# Patient Record
Sex: FEMALE | Race: OTHER | HISPANIC OR LATINO | Employment: OTHER | URBAN - METROPOLITAN AREA
[De-identification: names, ages, dates, MRNs, and addresses within clinical notes are randomized per-mention and may not be internally consistent; named-entity substitution may affect disease eponyms.]

---

## 2020-02-29 LAB — HCV AB SER-ACNC: NEGATIVE

## 2022-01-23 ENCOUNTER — HOSPITAL ENCOUNTER (OUTPATIENT)
Facility: HOSPITAL | Age: 77
Setting detail: OBSERVATION
Discharge: HOME | End: 2022-01-24
Attending: EMERGENCY MEDICINE | Admitting: INTERNAL MEDICINE
Payer: MEDICARE

## 2022-01-23 ENCOUNTER — APPOINTMENT (EMERGENCY)
Dept: RADIOLOGY | Facility: HOSPITAL | Age: 77
End: 2022-01-23
Payer: MEDICARE

## 2022-01-23 DIAGNOSIS — U07.1 COVID: ICD-10-CM

## 2022-01-23 DIAGNOSIS — R53.1 WEAKNESS: ICD-10-CM

## 2022-01-23 DIAGNOSIS — R55 NEAR SYNCOPE: Primary | ICD-10-CM

## 2022-01-23 PROBLEM — I10 ESSENTIAL HYPERTENSION: Status: ACTIVE | Noted: 2022-01-23

## 2022-01-23 PROBLEM — Z86.718 HISTORY OF DVT (DEEP VEIN THROMBOSIS): Status: ACTIVE | Noted: 2022-01-23

## 2022-01-23 PROBLEM — E03.9 ACQUIRED HYPOTHYROIDISM: Status: ACTIVE | Noted: 2022-01-23

## 2022-01-23 PROBLEM — D45 POLYCYTHEMIA VERA (HCC): Status: ACTIVE | Noted: 2022-01-23

## 2022-01-23 PROBLEM — C50.919 BREAST CANCER (HCC): Status: ACTIVE | Noted: 2022-01-23

## 2022-01-23 PROBLEM — E87.6 HYPOKALEMIA: Status: ACTIVE | Noted: 2022-01-23

## 2022-01-23 PROBLEM — J12.82 PNEUMONIA DUE TO COVID-19 VIRUS: Status: ACTIVE | Noted: 2022-01-23

## 2022-01-23 LAB
ALBUMIN SERPL BCP-MCNC: 2.5 G/DL (ref 3–5.2)
ALP SERPL-CCNC: 60 U/L (ref 43–122)
ALT SERPL W P-5'-P-CCNC: 12 U/L
ANION GAP SERPL CALCULATED.3IONS-SCNC: 2 MMOL/L (ref 5–14)
ANISOCYTOSIS BLD QL SMEAR: PRESENT
AST SERPL W P-5'-P-CCNC: 50 U/L (ref 14–36)
ATRIAL RATE: 77 BPM
BILIRUB SERPL-MCNC: 1.04 MG/DL
BUN SERPL-MCNC: 8 MG/DL (ref 5–25)
BURR CELLS BLD QL SMEAR: PRESENT
CALCIUM ALBUM COR SERPL-MCNC: 7.7 MG/DL (ref 8.3–10.1)
CALCIUM SERPL-MCNC: 6.5 MG/DL (ref 8.4–10.2)
CARDIAC TROPONIN I PNL SERPL HS: 3 NG/L
CHLORIDE SERPL-SCNC: 114 MMOL/L (ref 97–108)
CO2 SERPL-SCNC: 24 MMOL/L (ref 22–30)
CREAT SERPL-MCNC: 0.61 MG/DL (ref 0.6–1.2)
DACRYOCYTES BLD QL SMEAR: PRESENT
EOSINOPHIL # BLD AUTO: 0.92 THOUSAND/UL (ref 0–0.4)
EOSINOPHIL NFR BLD MANUAL: 6 % (ref 0–6)
ERYTHROCYTE [DISTWIDTH] IN BLOOD BY AUTOMATED COUNT: 25.3 %
GFR SERPL CREATININE-BSD FRML MDRD: 88 ML/MIN/1.73SQ M
GLUCOSE SERPL-MCNC: 97 MG/DL (ref 70–99)
HCT VFR BLD AUTO: 45.4 % (ref 36–46)
HGB BLD-MCNC: 13.4 G/DL (ref 12–16)
HYPERCHROMIA BLD QL SMEAR: PRESENT
LG PLATELETS BLD QL SMEAR: PRESENT
LYMPHOCYTES # BLD AUTO: 0.77 THOUSAND/UL (ref 0.5–4)
LYMPHOCYTES # BLD AUTO: 5 % (ref 25–45)
MCH RBC QN AUTO: 16.2 PG (ref 26–34)
MCHC RBC AUTO-ENTMCNC: 29.5 G/DL (ref 31–36)
MCV RBC AUTO: 55 FL (ref 80–100)
MICROCYTES BLD QL AUTO: PRESENT
MONOCYTES # BLD AUTO: 0.62 THOUSAND/UL (ref 0.2–0.9)
MONOCYTES NFR BLD AUTO: 4 % (ref 1–10)
NEUTS BAND NFR BLD MANUAL: 3 % (ref 0–8)
NEUTS SEG # BLD: 13.09 THOUSAND/UL (ref 1.8–7.8)
NEUTS SEG NFR BLD AUTO: 82 %
OVALOCYTES BLD QL SMEAR: PRESENT
P AXIS: 64 DEGREES
PLATELET # BLD AUTO: 279 THOUSANDS/UL (ref 150–450)
PLATELET BLD QL SMEAR: ADEQUATE
PMV BLD AUTO: 9 FL (ref 8.9–12.7)
POIKILOCYTOSIS BLD QL SMEAR: PRESENT
POLYCHROMASIA BLD QL SMEAR: PRESENT
POTASSIUM SERPL-SCNC: 3.5 MMOL/L (ref 3.6–5)
PR INTERVAL: 170 MS
PROT SERPL-MCNC: 5.6 G/DL (ref 5.9–8.4)
QRS AXIS: -60 DEGREES
QRSD INTERVAL: 134 MS
QT INTERVAL: 432 MS
QTC INTERVAL: 488 MS
RBC # BLD AUTO: 8.27 MILLION/UL (ref 4–5.2)
RBC MORPH BLD: ABNORMAL
SCHISTOCYTES BLD QL SMEAR: PRESENT
SODIUM SERPL-SCNC: 140 MMOL/L (ref 137–147)
T WAVE AXIS: 41 DEGREES
TOTAL CELLS COUNTED SPEC: 100
TSH SERPL DL<=0.05 MIU/L-ACNC: 1.43 UIU/ML (ref 0.47–4.68)
VENTRICULAR RATE: 77 BPM
WBC # BLD AUTO: 15.4 THOUSAND/UL (ref 4.5–11)

## 2022-01-23 PROCEDURE — 36415 COLL VENOUS BLD VENIPUNCTURE: CPT | Performed by: EMERGENCY MEDICINE

## 2022-01-23 PROCEDURE — 85027 COMPLETE CBC AUTOMATED: CPT | Performed by: EMERGENCY MEDICINE

## 2022-01-23 PROCEDURE — 71045 X-RAY EXAM CHEST 1 VIEW: CPT

## 2022-01-23 PROCEDURE — 93010 ELECTROCARDIOGRAM REPORT: CPT | Performed by: INTERNAL MEDICINE

## 2022-01-23 PROCEDURE — 93005 ELECTROCARDIOGRAM TRACING: CPT

## 2022-01-23 PROCEDURE — 99220 PR INITIAL OBSERVATION CARE/DAY 70 MINUTES: CPT | Performed by: INTERNAL MEDICINE

## 2022-01-23 PROCEDURE — 1124F ACP DISCUSS-NO DSCNMKR DOCD: CPT | Performed by: EMERGENCY MEDICINE

## 2022-01-23 PROCEDURE — 96360 HYDRATION IV INFUSION INIT: CPT

## 2022-01-23 PROCEDURE — 84484 ASSAY OF TROPONIN QUANT: CPT | Performed by: EMERGENCY MEDICINE

## 2022-01-23 PROCEDURE — 99285 EMERGENCY DEPT VISIT HI MDM: CPT

## 2022-01-23 PROCEDURE — 84443 ASSAY THYROID STIM HORMONE: CPT | Performed by: INTERNAL MEDICINE

## 2022-01-23 PROCEDURE — 80053 COMPREHEN METABOLIC PANEL: CPT | Performed by: EMERGENCY MEDICINE

## 2022-01-23 PROCEDURE — 85007 BL SMEAR W/DIFF WBC COUNT: CPT | Performed by: EMERGENCY MEDICINE

## 2022-01-23 PROCEDURE — 99285 EMERGENCY DEPT VISIT HI MDM: CPT | Performed by: EMERGENCY MEDICINE

## 2022-01-23 RX ORDER — ACETAMINOPHEN 325 MG/1
650 TABLET ORAL EVERY 6 HOURS PRN
Status: DISCONTINUED | OUTPATIENT
Start: 2022-01-23 | End: 2022-01-24 | Stop reason: HOSPADM

## 2022-01-23 RX ORDER — POTASSIUM CHLORIDE 20 MEQ/1
40 TABLET, EXTENDED RELEASE ORAL ONCE
Status: COMPLETED | OUTPATIENT
Start: 2022-01-23 | End: 2022-01-23

## 2022-01-23 RX ORDER — LEVOTHYROXINE SODIUM 125 UG/1
125 CAPSULE ORAL DAILY
COMMUNITY

## 2022-01-23 RX ORDER — SODIUM CHLORIDE 9 MG/ML
75 INJECTION, SOLUTION INTRAVENOUS CONTINUOUS
Status: DISCONTINUED | OUTPATIENT
Start: 2022-01-23 | End: 2022-01-24

## 2022-01-23 RX ORDER — LEVOTHYROXINE SODIUM 0.12 MG/1
125 TABLET ORAL
Status: DISCONTINUED | OUTPATIENT
Start: 2022-01-23 | End: 2022-01-24 | Stop reason: HOSPADM

## 2022-01-23 RX ORDER — LISINOPRIL 20 MG/1
20 TABLET ORAL DAILY
COMMUNITY

## 2022-01-23 RX ORDER — ONDANSETRON 2 MG/ML
4 INJECTION INTRAMUSCULAR; INTRAVENOUS EVERY 6 HOURS PRN
Status: DISCONTINUED | OUTPATIENT
Start: 2022-01-23 | End: 2022-01-24 | Stop reason: HOSPADM

## 2022-01-23 RX ADMIN — ONDANSETRON 4 MG: 2 INJECTION INTRAMUSCULAR; INTRAVENOUS at 09:46

## 2022-01-23 RX ADMIN — SODIUM CHLORIDE 1000 ML: 0.9 INJECTION, SOLUTION INTRAVENOUS at 06:05

## 2022-01-23 RX ADMIN — POTASSIUM CHLORIDE 40 MEQ: 1500 TABLET, EXTENDED RELEASE ORAL at 12:58

## 2022-01-23 RX ADMIN — APIXABAN 5 MG: 5 TABLET, FILM COATED ORAL at 09:46

## 2022-01-23 RX ADMIN — SODIUM CHLORIDE 75 ML/HR: 0.9 INJECTION, SOLUTION INTRAVENOUS at 09:45

## 2022-01-23 RX ADMIN — SODIUM CHLORIDE 75 ML/HR: 0.9 INJECTION, SOLUTION INTRAVENOUS at 23:25

## 2022-01-23 RX ADMIN — APIXABAN 5 MG: 5 TABLET, FILM COATED ORAL at 17:08

## 2022-01-23 RX ADMIN — LEVOTHYROXINE SODIUM 125 MCG: 125 TABLET ORAL at 09:45

## 2022-01-23 NOTE — ED NOTES
Attempted to get pt OOB for a walking pulse ox, but she stated she was too weak  Provider notified       Hillary Sampson RN  01/23/22 1919

## 2022-01-23 NOTE — NURSING NOTE
Patient has been stable, no complaints, denies SOB  O2 sats  on RA  Ocassional NP moist cough is the only symptom patient states she has at present  Patient ambulated to BR with steady gait, denied dizziness or lightheadedness  With ambulation, patient did not drop below 95% O2 sats on RA  Patient given walker just in case, but denied weakness  Patient remains stable, sitting up in bed watching TV and interactive and talking with staff  Call bell in reach, IVFs infusing

## 2022-01-23 NOTE — ASSESSMENT & PLAN NOTE
· Unvaccinated against COVID-19  · COVID positive 01/19/2022  · The patient does not requiring any supplemental oxygen at this time in will therefore not initiate remdesivir or Decadron  · Continue further supportive care

## 2022-01-23 NOTE — NURSING NOTE
Patient ambulated to [facility] independently with minimal furniture assistance  Patient stated she did not need a walker and denied weakness and ambulated to  without SOB or any difficulty  Patient got herself back into bed without any effort  Call bell in reach, IVF infusing

## 2022-01-23 NOTE — ASSESSMENT & PLAN NOTE
· Likely due to COVID-19  · The patient notes that she is usually able to ambulate independently and perform all ADLs  She lives at home with her daughter    · Will begin IV fluid resuscitation  · Supportive care for COVID pneumonia  · PT/OT evaluation

## 2022-01-23 NOTE — H&P
[facility]  H&P- Demian Ravi 1945, 77 y o  female MRN: [id number]  Unit/Bed#: 7T [facility] 714-02 Encounter: [id number]  Primary Care Provider: No primary care provider on file  Date and time admitted to hospital: 1/23/2022  4:34 AM    * Generalized weakness  Assessment & Plan  · Likely due to COVID-19  · The patient notes that she is usually able to ambulate independently and perform all ADLs  She lives at home with her daughter  · Will begin IV fluid resuscitation  · Supportive care for COVID pneumonia  · PT/OT evaluation    Pneumonia due to COVID-19 virus  Assessment & Plan  · Unvaccinated against COVID-19  · COVID positive 01/19/2022  · The patient does not requiring any supplemental oxygen at this time in will therefore not initiate remdesivir or Decadron  · Continue further supportive care    Hypokalemia  Assessment & Plan  · Replete and recheck morning labs    Polycythemia vera ([facility] [address] )  Assessment & Plan  · Follows outpatient with Heme-Onc  · Last phlebotomy was in December of 2021    History of DVT (deep vein thrombosis)  Assessment & Plan  · Continue Eliquis 5 mg b i d  Breast cancer ([facility] [address] )  Assessment & Plan  · Stage IIIB invasive ductal carcinoma  · Of outpatient with heme-onc    Acquired hypothyroidism  Assessment & Plan  · Check TSH  · Continue home levothyroxine 125 mcg daily    Essential hypertension  Assessment & Plan  · Though it is not documented, the emergency department reports episode of hypotension and dizziness  · Will hold home lisinopril overnight and initiate IV fluid resuscitation      VTE Pharmacologic Prophylaxis: VTE Score: 7 High Risk (Score >/= 5) - Pharmacological DVT Prophylaxis Ordered: apixaban (Eliquis)  Sequential Compression Devices Ordered  Code Status: Level 1 - Full Code   Discussion with family: Patient declined call to        Anticipated Length of Stay: Patient will be admitted on an observation basis with an anticipated length of stay of less than 2 midnights secondary to Generalized weakness due to COVID-19  Total Time for Visit, including Counseling / Coordination of Care: 70 minutes Greater than 50% of this total time spent on direct patient counseling and coordination of care  Chief Complaint:  Generalized weakness    History of Present Illness:  Iraj Gerber is a 77 y o  female with a PMH of polycythemia vera, invasive ductal carcinoma, hypothyroidism, history of DVT, and essential hypertension who presents with the complaint of generalized weakness  The patient was diagnosed with COVID-19 on 01/19/2022  Since that time she has noticed generalized fatigue that has worsened over the past several days  She denies any fever but does report a nonproductive cough  She is unvaccinated against COVID-19  Usually the patient is able to ambulate independently, however, at this time she believes that she is too weak to walk  She normally lives at home with her daughter  In the emergency department, ED physician noted that the patient was relatively hypotensive and felt dizzy  Additionally, nursing attempted to ambulate the patient to evaluate oxygen needs with ambulation but the patient refused to participate as she felt too weak  She had no significant lab abnormality  She was ultimately admitted to the medical floor under observation status for further management  Review of Systems:  Review of Systems   Constitutional: Negative for chills and fever  HENT: Negative for ear pain, sinus pressure and sore throat  Eyes: Negative for pain and visual disturbance  Respiratory: Positive for cough  Negative for shortness of breath and wheezing  Cardiovascular: Negative for chest pain and palpitations  Gastrointestinal: Negative for abdominal pain, constipation, diarrhea, nausea and vomiting  Genitourinary: Negative for dysuria and hematuria     Musculoskeletal: Negative for arthralgias, back pain and myalgias  Skin: Negative for color change and rash  Neurological: Positive for weakness  Negative for dizziness, seizures, syncope and headaches  Psychiatric/Behavioral: Negative for agitation, confusion and hallucinations  All other systems reviewed and are negative  Past Medical and Surgical History:   History reviewed  No pertinent past medical history  History reviewed  No pertinent surgical history  Meds/Allergies:  Prior to Admission medications    Medication Sig Start Date End Date Taking? Authorizing Provider   apixaban (Eliquis) 5 mg Take 5 mg by mouth 2 (two) times a day   Yes Historical Provider, MD   Levothyroxine Sodium 125 MCG CAPS Take 125 mcg by mouth in the morning   Yes Historical Provider, MD   lisinopril (ZESTRIL) 20 mg tablet Take 20 mg by mouth daily   Yes Historical Provider, MD     I have reviewed home medications using recent Epic encounter  Allergies: No Known Allergies    Social History:  Marital Status:    Patient Pre-hospital Living Situation: Home  Patient Pre-hospital Level of Mobility: walks  Patient Pre-hospital Diet Restrictions: None    Substance Use History:   Social History     Substance and Sexual Activity   Alcohol Use Not Currently     Social History     Tobacco Use   Smoking Status Never Smoker   Smokeless Tobacco Never Used     Social History     Substance and Sexual Activity   Drug Use Not Currently       Family History:  History reviewed  No pertinent family history  Physical Exam:     Vitals:   Blood Pressure: 151/83 (01/23/22 0900)  Pulse: 84 (01/23/22 0900)  Temperature: 98 8 °F (37 1 °C) (01/23/22 0900)  Temp Source: Temporal (01/23/22 0900)  Respirations: 18 (01/23/22 0900)  Height: 5' 3" (160 cm) (01/23/22 0900)  Weight - Scale: 71 8 kg (158 lb 4 6 oz) (01/23/22 0900)  SpO2: 98 % (01/23/22 0900)    Physical Exam  Vitals and nursing note reviewed  Constitutional:       General: She is not in acute distress       Appearance: Normal appearance  She is well-developed and normal weight  HENT:      Head: Normocephalic and atraumatic  Mouth/Throat:      Mouth: Mucous membranes are moist       Pharynx: Oropharynx is clear  Eyes:      Extraocular Movements: Extraocular movements intact  Conjunctiva/sclera: Conjunctivae normal    Cardiovascular:      Rate and Rhythm: Normal rate and regular rhythm  Pulses: Normal pulses  Heart sounds: Normal heart sounds  No murmur heard  Pulmonary:      Effort: Pulmonary effort is normal  No respiratory distress  Breath sounds: Normal breath sounds  Abdominal:      General: Bowel sounds are normal  There is no distension  Palpations: Abdomen is soft  Tenderness: There is no abdominal tenderness  Musculoskeletal:         General: Normal range of motion  Cervical back: Normal range of motion and neck supple  Skin:     General: Skin is warm and dry  Neurological:      General: No focal deficit present  Mental Status: She is alert and oriented to person, place, and time  Mental status is at baseline     Psychiatric:         Mood and Affect: Mood normal          Behavior: Behavior normal          Judgment: Judgment normal           Lab Results:  Results from last 7 days   Lab Units 01/23/22  0535   WBC Thousand/uL 15 40*   HEMOGLOBIN g/dL 13 4   HEMATOCRIT % 45 4   PLATELETS Thousands/uL 279   BANDS PCT % 3   LYMPHO PCT % 5*   MONO PCT % 4   EOS PCT % 6     Results from last 7 days   Lab Units 01/23/22  0456   SODIUM mmol/L 140   POTASSIUM mmol/L 3 5*   CHLORIDE mmol/L 114*   CO2 mmol/L 24   BUN mg/dL 8   CREATININE mg/dL 0 61   ANION GAP mmol/L 2*   CALCIUM mg/dL 6 5*   ALBUMIN g/dL 2 5*   TOTAL BILIRUBIN mg/dL 1 04   ALK PHOS U/L 60   ALT U/L 12   AST U/L 50*   GLUCOSE RANDOM mg/dL 97                       Imaging: Reviewed radiology reports from this admission including: chest xray  XR chest 1 view portable   Final Result by Saanm Vigil MD (01/23 1142)      Question mild pulmonary venous congestion versus groundglass opacity from Covid-19  Workstation performed: [id number]             EKG and Other Studies Reviewed on Admission:   · EKG: NSR  HR 77     ** Please Note: This note has been constructed using a voice recognition system   **

## 2022-01-23 NOTE — ASSESSMENT & PLAN NOTE
· Though it is not documented, the emergency department reports episode of hypotension and dizziness  · Will hold home lisinopril overnight and initiate IV fluid resuscitation

## 2022-01-23 NOTE — ED PROVIDER NOTES
History  Chief Complaint   Patient presents with    Flu Symptoms     Pt tested positive for Covid on Monday and is now reporting dizziness  En route [facility] stated low blood pressure  HPI    77 hx of polycythemia, dvt on eliquis since 2018, htn presents to ed for eval of weakness  Patient tested positive for covid a few days ago  Having intermittent lightheadedness and fatigue  Feels near syncope with ambulation  No sob or chest pain  Has myalgias  Denies other complaints on ros  Reportedly en route patient was hypotensive 90/50  Prior to Admission Medications   Prescriptions Last Dose Informant Patient Reported? Taking? Levothyroxine Sodium 125 MCG CAPS 1/22/2022 at Unknown time  Yes Yes   Sig: Take 125 mcg by mouth in the morning   apixaban (Eliquis) 5 mg 1/22/2022 at Unknown time  Yes Yes   Sig: Take 5 mg by mouth 2 (two) times a day   lisinopril (ZESTRIL) 20 mg tablet 1/22/2022 at Unknown time  Yes Yes   Sig: Take 20 mg by mouth daily      Facility-Administered Medications: None       History reviewed  No pertinent past medical history  Medication Sig Dispensed Refills Start Date End Date Status   aspirin 81 MG EC tablet    Indications: Polycythemia vera (HCC) Take 1 tablet (81 mg total) by mouth daily  90 tablet   3 12/09/2020   Active   levothyroxine (SYNTHROID, LEVOTHROID) 125 MCG tablet    Indications: Hypothyroidism, unspecified type Take 1 tablet (125 mcg total) by mouth daily  90 tablet   2 02/22/2021   Active   apixaban (ELIQUIS) 5 mg tab   Take 1 tablet (5 mg total) by mouth 2 (two) times a day  180 tablet   3 07/12/2021   Active   lisinopriL (PRINIVIL,ZESTRIL) 20 MG tablet   Take 1 tablet (20 mg total) by mouth daily  90 tablet   3 07/12/2021   Active         History reviewed  No pertinent surgical history  History reviewed  No pertinent family history  I have reviewed and agree with the history as documented      E-Cigarette/Vaping    E-Cigarette Use Never User E-Cigarette/Vaping Substances    Nicotine No     THC No     CBD No     Flavoring No     Other No     Unknown No      Social History     Tobacco Use    Smoking status: Never Smoker    Smokeless tobacco: Never Used   Vaping Use    Vaping Use: Never used   Substance Use Topics    Alcohol use: Not Currently    Drug use: Not Currently       Review of Systems   Constitutional: Positive for chills and fatigue  Negative for fever  HENT: Negative for sore throat  Eyes: Negative for redness and visual disturbance  Respiratory: Positive for shortness of breath  Negative for cough  Cardiovascular: Negative for chest pain  Gastrointestinal: Negative for abdominal pain, diarrhea and nausea  Genitourinary: Negative for difficulty urinating, dysuria and pelvic pain  Musculoskeletal: Negative for back pain  Skin: Negative for rash  Neurological: Positive for weakness  Negative for syncope and headaches  All other systems reviewed and are negative  Physical Exam  Physical Exam  Vitals and nursing note reviewed  Constitutional:       General: She is not in acute distress  HENT:      Head: Normocephalic and atraumatic  Eyes:      Conjunctiva/sclera: Conjunctivae normal    Cardiovascular:      Rate and Rhythm: Normal rate and regular rhythm  Heart sounds: Normal heart sounds  Pulmonary:      Effort: Pulmonary effort is normal  No respiratory distress  Breath sounds: Normal breath sounds  Comments: No hypoxia or tachypnea  Abdominal:      General: Bowel sounds are normal       Palpations: Abdomen is soft  Tenderness: There is no abdominal tenderness  Musculoskeletal:         General: Normal range of motion  Cervical back: Normal range of motion  Comments: Refusing to ambulate due to weakness  But moving all extremities    Skin:     General: Skin is warm and dry  Findings: No rash     Neurological:      Mental Status: She is alert and oriented to person, place, and time  Cranial Nerves: No cranial nerve deficit  Sensory: No sensory deficit  Motor: No abnormal muscle tone  Coordination: Coordination normal          Vital Signs  ED Triage Vitals [01/23/22 0437]   Temperature Pulse Respirations Blood Pressure SpO2   98 5 °F (36 9 °C) 76 18 159/70 96 %      Temp Source Heart Rate Source Patient Position - Orthostatic VS BP Location FiO2 (%)   Tympanic Monitor Sitting Left arm --      Pain Score       No Pain           Vitals:    01/23/22 0437 01/23/22 0605 01/23/22 0900 01/23/22 1500   BP: 159/70 118/63 151/83 136/81   Pulse: 76 81 84 91   Patient Position - Orthostatic VS: Sitting Lying Lying Lying         Visual Acuity      ED Medications  Medications   apixaban (ELIQUIS) tablet 5 mg (5 mg Oral Given 1/23/22 1708)   levothyroxine tablet 125 mcg (125 mcg Oral Given 1/23/22 0945)   sodium chloride 0 9 % infusion (75 mL/hr Intravenous New Bag 1/23/22 0945)   acetaminophen (TYLENOL) tablet 650 mg (has no administration in time range)   ondansetron (ZOFRAN) injection 4 mg (4 mg Intravenous Given 1/23/22 0946)   sodium chloride 0 9 % bolus 1,000 mL (0 mL Intravenous Stopped 1/23/22 0716)   potassium chloride (K-DUR,KLOR-CON) CR tablet 40 mEq (40 mEq Oral Given 1/23/22 1258)       Diagnostic Studies  Results Reviewed     Procedure Component Value Units Date/Time    TSH, 3rd generation [[id number]]  (Normal) Collected: 01/23/22 1311    Lab Status: Final result Specimen: Blood from Arm, Right Updated: 01/23/22 1421     TSH 3RD GENERATON 1 430 uIU/mL     Narrative:      Patients undergoing fluorescein dye angiography may retain small amounts of fluorescein in the body for 48-72 hours post procedure  Samples containing fluorescein can produce falsely depressed TSH values  If the patient had this procedure,a specimen should be resubmitted post fluorescein clearance        Manual Differential (Non Wam) [[id number]]  (Abnormal) Collected: 01/23/22 9081    Lab Status: Final result Specimen: Blood from Arm, Left Updated: 01/23/22 0604     Segmented % 82 %      Bands % 3 %      Lymphocytes % 5 %      Monocytes % 4 %      Eosinophils, % 6 %      Neutrophils Absolute 13 09 Thousand/uL      Lymphocytes Absolute 0 77 Thousand/uL      Monocytes Absolute 0 62 Thousand/uL      Eosinophils Absolute 0 92 Thousand/uL      Total Counted 100     RBC Morphology abnormal     Anisocytosis Present     Kai Cells Present     Hypochromia Present     Microcytes Present     Ovalocytes Present     Poikilocytes Present     Polychromasia Present     Schistocytes Present     Platelet Estimate Adequate     Large Platelet Present     Tear Drop Cells Present    CBC and differential [[id number]]  (Abnormal) Collected: 01/23/22 0535    Lab Status: Final result Specimen: Blood from Arm, Left Updated: 01/23/22 0559     WBC 15 40 Thousand/uL      RBC 8 27 Million/uL      Hemoglobin 13 4 g/dL      Hematocrit 45 4 %      MCV 55 fL      MCH 16 2 pg      MCHC 29 5 g/dL      RDW 25 3 %      MPV 9 0 fL      Platelets 624 Thousands/uL     HS Troponin 0hr (reflex protocol) [[id number]] Collected: 01/23/22 0456    Lab Status: Final result Specimen: Blood from Arm, Left Updated: 01/23/22 0526     hs TnI 0hr 3 ng/L     Comprehensive metabolic panel [[id number]]  (Abnormal) Collected: 01/23/22 0456    Lab Status: Final result Specimen: Blood from Arm, Left Updated: 01/23/22 0518     Sodium 140 mmol/L      Potassium 3 5 mmol/L      Chloride 114 mmol/L      CO2 24 mmol/L      ANION GAP 2 mmol/L      BUN 8 mg/dL      Creatinine 0 61 mg/dL      Glucose 97 mg/dL      Calcium 6 5 mg/dL      Corrected Calcium 7 7 mg/dL      AST 50 U/L      ALT 12 U/L      Alkaline Phosphatase 60 U/L      Total Protein 5 6 g/dL      Albumin 2 5 g/dL      Total Bilirubin 1 04 mg/dL      eGFR 88 ml/min/1 73sq m     Narrative:      Hemolysis  National Kidney Disease Foundation guidelines for Chronic Kidney Disease (CKD):     Stage 1 with normal or high GFR (GFR > 90 mL/min/1 73 square meters)    Stage 2 Mild CKD (GFR = 60-89 mL/min/1 73 square meters)    Stage 3A Moderate CKD (GFR = 45-59 mL/min/1 73 square meters)    Stage 3B Moderate CKD (GFR = 30-44 mL/min/1 73 square meters)    Stage 4 Severe CKD (GFR = 15-29 mL/min/1 73 square meters)    Stage 5 End Stage CKD (GFR <15 mL/min/1 73 square meters)  Note: GFR calculation is accurate only with a steady state creatinine                 XR chest 1 view portable   Final Result by Oralia Moss MD (01/23 1142)      Question mild pulmonary venous congestion versus groundglass opacity from Covid-19  Workstation performed: [id number]                    Procedures  Procedures         ED Course  ED Course as of 01/23/22 2207   Griffin rIeland Jan 23, 2022   0532 ECG 12 Lead Documentation  Date/Time: today/date: 1/23/2022  Performed by: Tacho Fulton  Authorized by: Tacho Fulton    ECG reviewed by me, the ED Provider: yes    Patient location:  ED   Previous ECG: If available: NSR w/ RBBB  Rate:  ECG rate assessment: normal    Rhythm: sinus rhythm    Ectopy: RBBB    QRS axis:  Normal  Intervals: normal   Q waves: None   ST segments:  No acute ST changes  T waves: normal      Impression: Normal Sinus EKG with RBBB       0712 Signed out to Dr Ce Perry pending admission for fatigue near syncope in setting of covid positive test 1/19/22             [facility]    78 yo F w/ waekness  Known covid positive  Labs wnl  Patient though has near syncope  Unable to ambulate  Hypotensive earlier  Admitted to medicine for near syncope      Disposition  Final diagnoses:   Near syncope   COVID   Weakness     Time reflects when diagnosis was documented in both MDM as applicable and the Disposition within this note     Time User Action Codes Description Comment    1/23/2022  7:26 AM Josef Bhagato Add [R55] Near syncope     1/23/2022  7:26 AM Josef Mims Add [U07 1] COVID     1/23/2022  7:26 AM Josef Mims Add [R53 1] Weakness ED Disposition     ED Disposition Condition Date/Time Comment    Admit Stable Sun Jan 23, 2022  7:26 AM Case was discussed with Dr Irena Montalvo and the patient's admission status was agreed to be Admission Status: observation status to the service of Dr Irena Montalvo   Follow-up Information    None         Current Discharge Medication List      CONTINUE these medications which have NOT CHANGED    Details   apixaban (Eliquis) 5 mg Take 5 mg by mouth 2 (two) times a day      Levothyroxine Sodium 125 MCG CAPS Take 125 mcg by mouth in the morning      lisinopril (ZESTRIL) 20 mg tablet Take 20 mg by mouth daily             No discharge procedures on file      PDMP Review     None          ED Provider  Electronically Signed by           Charo Sawyer MD  01/23/22 8275

## 2022-01-24 VITALS
HEIGHT: 63 IN | SYSTOLIC BLOOD PRESSURE: 152 MMHG | TEMPERATURE: 96.7 F | OXYGEN SATURATION: 97 % | RESPIRATION RATE: 20 BRPM | WEIGHT: 158.29 LBS | HEART RATE: 74 BPM | DIASTOLIC BLOOD PRESSURE: 79 MMHG | BODY MASS INDEX: 28.05 KG/M2

## 2022-01-24 LAB
ANION GAP SERPL CALCULATED.3IONS-SCNC: 6 MMOL/L (ref 5–14)
BUN SERPL-MCNC: 6 MG/DL (ref 5–25)
CALCIUM SERPL-MCNC: 8.1 MG/DL (ref 8.4–10.2)
CHLORIDE SERPL-SCNC: 109 MMOL/L (ref 97–108)
CO2 SERPL-SCNC: 24 MMOL/L (ref 22–30)
CREAT SERPL-MCNC: 0.72 MG/DL (ref 0.6–1.2)
ERYTHROCYTE [DISTWIDTH] IN BLOOD BY AUTOMATED COUNT: 25.1 %
GFR SERPL CREATININE-BSD FRML MDRD: 82 ML/MIN/1.73SQ M
GLUCOSE P FAST SERPL-MCNC: 62 MG/DL (ref 70–99)
GLUCOSE SERPL-MCNC: 62 MG/DL (ref 70–99)
HCT VFR BLD AUTO: 46.9 % (ref 36–46)
HGB BLD-MCNC: 13 G/DL (ref 12–16)
MCH RBC QN AUTO: 15.5 PG (ref 26–34)
MCHC RBC AUTO-ENTMCNC: 27.8 G/DL (ref 31–36)
MCV RBC AUTO: 56 FL (ref 80–100)
PLATELET # BLD AUTO: 310 THOUSANDS/UL (ref 150–450)
PMV BLD AUTO: 8.8 FL (ref 8.9–12.7)
POTASSIUM SERPL-SCNC: 4.5 MMOL/L (ref 3.6–5)
RBC # BLD AUTO: 8.41 MILLION/UL (ref 4–5.2)
SODIUM SERPL-SCNC: 139 MMOL/L (ref 137–147)
WBC # BLD AUTO: 9.5 THOUSAND/UL (ref 4.5–11)

## 2022-01-24 PROCEDURE — 97162 PT EVAL MOD COMPLEX 30 MIN: CPT

## 2022-01-24 PROCEDURE — 85027 COMPLETE CBC AUTOMATED: CPT | Performed by: INTERNAL MEDICINE

## 2022-01-24 PROCEDURE — 80048 BASIC METABOLIC PNL TOTAL CA: CPT | Performed by: INTERNAL MEDICINE

## 2022-01-24 PROCEDURE — 99217 PR OBSERVATION CARE DISCHARGE MANAGEMENT: CPT | Performed by: STUDENT IN AN ORGANIZED HEALTH CARE EDUCATION/TRAINING PROGRAM

## 2022-01-24 RX ADMIN — APIXABAN 5 MG: 5 TABLET, FILM COATED ORAL at 08:22

## 2022-01-24 RX ADMIN — ACETAMINOPHEN 650 MG: 325 TABLET ORAL at 04:35

## 2022-01-24 RX ADMIN — LEVOTHYROXINE SODIUM 125 MCG: 125 TABLET ORAL at 05:25

## 2022-01-24 NOTE — NURSING NOTE
IV removed with tip intact  Pressure held to control bleeding  Discharge instructions discussed with patient, to include care of self at home with Covid-19  She verbalizes understanding  Patient left with all her belongings and discharge instructions

## 2022-01-24 NOTE — PHYSICAL THERAPY NOTE
Physical Therapy Evaluation    Patient's Name: Vesta Lira    Admitting Diagnosis  Weakness [R53 1]  Near syncope [R55]  Flu-like symptoms [R68 89]  COVID [U07 1]    Problem List  Patient Active Problem List   Diagnosis    Generalized weakness    Pneumonia due to COVID-19 virus    Hypokalemia    [facility]cy[facility]mia vera ([facility] )    History of DVT (deep vein thrombosis)    [facility]    Acquired hypothyroidism    Essential hypertension       Past Medical History  History reviewed  No pertinent past medical history  Past Surgical History  History reviewed  No pertinent surgical history  Recent Imaging  XR chest 1 view portable   Final Result by River Duran MD (01/23 1142)      Question mild pulmonary venous congestion versus groundglass opacity from Covid-19                    Workstation performed: [id number]             Recent Vital Signs  Vitals:    01/23/22 2239 01/24/22 0000 01/24/22 0600 01/24/22 0700   BP: 126/66   152/79   BP Location: Right arm   Right arm   Pulse: 88   74   Resp: 17   20   Temp: (!) 97 2 °F (36 2 °C)   (!) 96 7 °F (35 9 °C)   TempSrc: Temporal   Temporal   SpO2: 98% 96% 94% 97%   Weight:       Height:            01/24/22 0950   PT Last Visit   PT Visit Date 01/24/22   Note Type   Note type Evaluation   Pain Assessment   Pain Assessment Tool 0-10   Pain Score No Pain   Home Living   Type of Home Apartment   Home Layout One level;Performs ADLs on one level   Prior Function   Level of [place] Independent with ADLs and functional mobility   Lives With Daughter   Receives Help From Family   ADL Assistance Independent   IADLs Independent   Falls in the last 6 months 0   General   Family/Caregiver Present No   Cognition   Overall Cognitive Status WFL   Arousal/Participation Alert   Orientation Level Oriented X4   Memory Within functional limits   Following Commands Follows all commands and directions without difficulty   RLE Assessment   RLE Assessment   (4/5) LLE Assessment   LLE Assessment   (4/5)   Coordination   Movements are Fluid and Coordinated 1   Sensation WFL   Light Touch   RLE Light Touch Grossly intact   LLE Light Touch Grossly intact   Bed Mobility   Supine to Sit 6  Modified independent   Additional items Increased time required   Sit to Supine 6  Modified independent   Additional items Increased time required   Transfers   Sit to Stand 6  Modified independent   Stand to Sit 6  Modified independent   Ambulation/Elevation   Gait pattern Step through pattern;Decreased toe off;Decreased heel strike; Short stride;Decreased foot clearance   Gait Assistance 6  Modified independent   Assistive Device None   Distance 100ft   Balance   Static Sitting Fair +   Dynamic Sitting Fair +   Static Standing Fair   Dynamic Standing Fair   Ambulatory Fair   Endurance Deficit   Endurance Deficit Yes   Endurance Deficit Description mild fatigue   Activity Tolerance   Activity Tolerance Patient tolerated treatment well   Medical Staff Made Aware spoke to CM   Nurse Made Aware spoke to RN   Assessment   Prognosis Good   Problem List Decreased strength;Decreased endurance; Impaired balance;Decreased mobility   Barriers to Discharge Inaccessible home environment   Goals   Patient Goals to go home   Recommendation   PT Discharge Recommendation No rehabilitation needs   AM-PAC Basic Mobility Inpatient   Turning in Bed Without Bedrails 4   Lying on Back to Sitting on Edge of Flat Bed 4   Moving Bed to Chair 4   Standing Up From Chair 4   Walk in Room 4   Climb 3-5 Stairs 3   Basic Mobility Inpatient Raw Score 23   Basic Mobility Standardized Score 50 88   Highest Level Of Mobility   JH-HLM Goal 7: Walk 25 feet or more   JH-HLM Highest Level of Mobility 7: Walk 25 feet or more   JH-HLM Goal Achieved Yes   End of Consult   Patient Position at End of Consult Seated edge of bed; All needs within reach         ASSESSMENT Rosario Barajas is a 77 y o  female admitted to [facility] on 1/23/2022 for Generalized weakness  Pt  has no past medical history on file    PT was consulted and pt was seen on 1/24/2022 for mobility assessment and d/c planning  Pt presents supine in bed alert and agreeable to therapy reporting no pain at this time  They lives with other family member in a apartment  with Walk-up (no stairs to enter, lives on one floor)  Pt currently using None  Impairments limiting pt at this time include weakness, impaired balance, decreased endurance and decreased activity tolerance  Pt is currently functioning at a modified independent assistance level for bed mobility, modified independent assistance level for transfers, modified independent assistance level for ambulation with no assistive device  The patient's AM-PAC Basic Mobility Inpatient Short Form Raw Score is 23  A Raw score of greater than 16 suggests the patient may benefit from discharge to home  Please also refer to the recommendation of the Physical Therapist for safe discharge planning      Recommendations                                                                                                              No inpt therapy needs    DME: None    Discharge Disposition:  Home with no needs      Piper Chance PT, DPT

## 2022-01-24 NOTE — PLAN OF CARE
Problem: MOBILITY - ADULT  Goal: Maintain or return to baseline ADL function  Description: INTERVENTIONS:  -  Assess patient's ability to carry out ADLs; assess patient's baseline for ADL function and identify physical deficits which impact ability to perform ADLs (bathing, care of mouth/teeth, toileting, grooming, dressing, etc )  - Assess/evaluate cause of self-care deficits   - Assess range of motion  - Assess patient's mobility; develop plan if impaired  - Assess patient's need for assistive devices and provide as appropriate  - Encourage maximum independence but intervene and supervise when necessary  - Involve family in performance of ADLs  - Assess for home care needs following discharge   - Consider OT consult to assist with ADL evaluation and planning for discharge  - Provide patient education as appropriate  Outcome: Progressing     Problem: INFECTION - ADULT  Goal: Absence or prevention of progression during hospitalization  Description: INTERVENTIONS:  - Assess and monitor for signs and symptoms of infection  - Monitor lab/diagnostic results  - Monitor all insertion sites, i e  indwelling lines, tubes, and drains  - Monitor endotracheal if appropriate and nasal secretions for changes in amount and color  - [place] appropriate cooling/warming therapies per order  - Administer medications as ordered  - Instruct and encourage patient and family to use good hand hygiene technique  - Identify and instruct in appropriate isolation precautions for identified infection/condition  Outcome: Progressing     Problem: DISCHARGE PLANNING  Goal: Discharge to home or other facility with appropriate resources  Description: INTERVENTIONS:  - Identify barriers to discharge w/patient and caregiver  - Arrange for needed discharge resources and transportation as appropriate  - Identify discharge learning needs (meds, wound care, etc )  - Arrange for interpretive services to assist at discharge as needed  - Refer to Case Management Department for coordinating discharge planning if the patient needs post-hospital services based on physician/advanced practitioner order or complex needs related to functional status, cognitive ability, or social support system  Outcome: Progressing     Problem: Knowledge Deficit  Goal: Patient/family/caregiver demonstrates understanding of disease process, treatment plan, medications, and discharge instructions  Description: Complete learning assessment and assess knowledge base    Interventions:  - Provide teaching at level of understanding  - Provide teaching via preferred learning methods  Outcome: Progressing

## 2022-01-24 NOTE — UTILIZATION REVIEW
Initial Clinical Review    Admission: Date/Time/Statement:   Admission Orders (From admission, onward)     Ordered        01/23/22 0726  Place in Observation  Once                      Orders Placed This Encounter   Procedures    Place in Observation     Standing Status:   Standing     Number of Occurrences:   1     Order Specific Question:   Level of Care     Answer:   Med Surg [16]     ED Arrival Information     Expected Arrival Acuity    - 1/23/2022 04:34 Urgent         Means of arrival Escorted by Service Admission type    [facility] ([address]) Hospitalist Urgent         Arrival complaint    COVID symptoms        Chief Complaint   Patient presents with    Flu Symptoms     Pt tested positive for Covid on Monday and is now reporting dizziness  En route [facility] stated low blood pressure  Initial Presentation:  77 y o  female who presented  to [facility] ED  Inpatient admission for evaluation and treatment of  COVID - 19 ( diagnosed on 1/19 - unvaccinated ) with a complaint of generalized weakness and found to be hypotensive in the ED  She has a pmh of polycythema vera, invasive ductal carcinoma, hypothyroidism, Hx of DVT and essential hypertension  On exam, she had a positive cough,  chills, and fatigue with  SOB, with no hypoxia and generalized weakness  She is starterd on IVF resuscitation and supportive care for COVID pneumonia  PT/OT evaluation, monitor O2 sats               ED Triage Vitals [01/23/22 0437]   Temperature Pulse Respirations Blood Pressure SpO2   98 5 °F (36 9 °C) 76 18 159/70 96 %      Temp Source Heart Rate Source Patient Position - Orthostatic VS BP Location FiO2 (%)   Tympanic Monitor Sitting Left arm --      Pain Score       No Pain          Wt Readings from Last 1 Encounters:   01/23/22 71 8 kg (158 lb 4 6 oz)     Additional Vital Signs:   Date/Time Temp Pulse Resp BP MAP (mmHg) SpO2 O2 Device Patient Position - Orthostatic VS   01/24/22 0600 -- -- -- -- -- 94 % None (Room air) --   01/24/22 0000 -- -- -- -- -- 96 % None (Room air) --   01/23/22 2239 97 2 °F (36 2 °C) Abnormal  88 17 126/66 -- 98 % None (Room air) Lying   01/23/22 1845 -- -- -- -- -- 97 % None (Room air) --   01/23/22 1614 -- -- -- -- -- 99 % None (Room air) --   01/23/22 1500 97 4 °F (36 3 °C) Abnormal  91 18 136/81 104 97 % None (Room air) Lying   01/23/22 1300 -- -- -- -- -- 96 % None (Room air) --   01/23/22 0900 98 8 °F (37 1 °C) 84 18 151/83 104 98 % None (Room air) Lying   01/23/22 0605 -- 81 16 118/63 -- 100 % None (Room air) Lying           Pertinent Labs/Diagnostic Test Results:       Results from last 7 days   Lab Units 01/23/22  0535   WBC Thousand/uL 15 40*   HEMOGLOBIN g/dL 13 4   HEMATOCRIT % 45 4   PLATELETS Thousands/uL 279   TOTAL NEUT ABS Thousand/uL 13 09*   BANDS PCT % 3         Results from last 7 days   Lab Units 01/23/22  0456   SODIUM mmol/L 140   POTASSIUM mmol/L 3 5*   CHLORIDE mmol/L 114*   CO2 mmol/L 24   ANION GAP mmol/L 2*   BUN mg/dL 8   CREATININE mg/dL 0 61   EGFR ml/min/1 73sq m 88   CALCIUM mg/dL 6 5*     Results from last 7 days   Lab Units 01/23/22  0456   AST U/L 50*   ALT U/L 12   ALK PHOS U/L 60   TOTAL PROTEIN g/dL 5 6*   ALBUMIN g/dL 2 5*   TOTAL BILIRUBIN mg/dL 1 04     Results from last 7 days   Lab Units 01/23/22  0456   GLUCOSE RANDOM mg/dL 97       Results from last 7 days   Lab Units 01/23/22  0456   HS TNI 0HR ng/L 3     Results from last 7 days   Lab Units 01/23/22  1311   TSH 3RD GENERATON uIU/mL 1 430       Results from last 7 days   Lab Units 01/23/22  0535   TOTAL COUNTED  100     CXR - 1/23 - Question mild pulmonary venous congestion versus groundglass opacity from Covid-19       ECG - 1/23 - NSR - L axis deviation - RBBB    ED Treatment:   Medication Administration from 01/23/2022 0433 to 01/23/2022 0830       Date/Time Order Dose Route Action Comments     01/23/2022 0605 sodium chloride 0 9 % bolus 1,000 mL 1,000 mL Intravenous New Bag         History reviewed  No pertinent past medical history  Present on Admission:  **None**      Admitting Diagnosis: Weakness [R53 1]  Near syncope [R55]  Flu-like symptoms [R68 89]  COVID [U07 1]  Age/Sex: 77 y o  female         Admission Orders:  Scheduled Medications:  apixaban, 5 mg, Oral, BID  levothyroxine, 125 mcg, Oral, Early Morning  Kdur 40 meq PO once - 1/23 x 1     Continuous IV Infusions:  sodium chloride, 75 mL/hr, Intravenous, Continuous      PRN Meds:  acetaminophen, 650 mg, Oral, Q6H PRN - 1/24 x 1   ondansetron, 4 mg, Intravenous, Q6H PRN - 1/23 x 1         Nursing Orders -  VS - incentive spirometry - Ambulate q shift - SCD's to le's - Diet regular house  - PT/OT evaluation     Network Utilization Review Department  ATTENTION: Please call with any questions or concerns to [phone] and carefully listen to the prompts so that you are directed to the right person  All voicemails are confidential   [place] all requests for admission clinical reviews, approved or denied determinations and any other requests to dedicated fax number below belonging to the campus where the patient is receiving treatment   List of dedicated fax numbers for the Facilities:  [address] DENIALS (Administrative/Medical Necessity) [phone]   [address] (Maternity/NICU/Pediatrics) [phone]   [address]  [phone]   [address] [address] [place] [address] [address] [phone] [address] [place] [address] [address] [phone]

## 2022-01-24 NOTE — DISCHARGE SUMMARY
[facility]  Discharge- Sameul Newer 1945, 77 y o  female MRN: [id number]  Unit/Bed#: 7T [facility] 714-02 Encounter: [id number]  Primary Care Provider: No primary care provider on file  Date and time admitted to hospital: 1/23/2022  4:34 AM    Essential hypertension  Assessment & Plan  · Though it is not documented, the emergency department reports episode of hypotension and dizziness  · Will hold home lisinopril overnight and initiate IV fluid resuscitation    Acquired hypothyroidism  Assessment & Plan  · Check TSH  · Continue home levothyroxine 125 mcg daily    Breast cancer ([facility] )  Assessment & Plan  · Stage IIIB invasive ductal carcinoma  · Of outpatient with heme-onc    Polycythemia vera ([facility] [address] )  Assessment & Plan  · Follows outpatient with Heme-Onc  · Last phlebotomy was in December of 2021    Hypokalemia  Assessment & Plan  · Replete and recheck morning labs    Pneumonia due to COVID-19 virus  Assessment & Plan  · Unvaccinated against COVID-19  · COVID positive 01/19/2022  · The patient does not requiring any supplemental oxygen at this time in will therefore not initiate remdesivir or Decadron  · Continue further supportive care    * Generalized weakness  Assessment & Plan  · Likely due to COVID-19  · The patient notes that she is usually able to ambulate independently and perform all ADLs  She lives at home with her daughter  · Patient received IV fluids  · Supportive care for COVID pneumonia      PT OT was consulted patient with no needs, able to ambulate well independently    Medically stable for discharge, will discharge home        Medical Problems             Resolved Problems  Date Reviewed: 1/24/2022    None              Discharging Physician / Practitioner: Roseann Briggs DO  PCP: No primary care provider on file    Admission Date:   Admission Orders (From admission, onward)     Ordered        01/23/22 0726  Place in Observation  Once Discharge Date: 01/24/22    Consultations During Hospital Stay:  · PT/OT    Procedures Performed:   · None    Significant Findings / Test Results:   · None    Incidental Findings:   · None    Test Results Pending at Discharge (will require follow up): · None     Outpatient Tests Requested:  · None    Complications:  None    Reason for Admission:  Generalized weakness    Hospital Course:   Santana Burch is a 77 y o  female patient who originally presented to the hospital on 1/23/2022 due to generalized weakness secondary to COVID-19 infection  Patient was treated with supportive care and given IV fluids overnight  PT OT evaluated patient deemed patient able to ambulate independently and with no PT OT needs  Patient will be discharged home  Patient was advised follow-up with PCP within 7 days  Please see above list of diagnoses and related plan for additional information  Condition at Discharge: good    Discharge Day Visit / Exam:   Subjective:  Patient states she feels much better and wants to go home  Vitals: Blood Pressure: 152/79 (01/24/22 0700)  Pulse: 74 (01/24/22 0700)  Temperature: (!) 96 7 °F (35 9 °C) (01/24/22 0700)  Temp Source: Temporal (01/24/22 0700)  Respirations: 20 (01/24/22 0700)  Height: 5' 3" (160 cm) (01/23/22 0900)  Weight - Scale: 71 8 kg (158 lb 4 6 oz) (01/23/22 0900)  SpO2: 97 % (01/24/22 0700)  Exam:   Physical Exam  Constitutional:       General: She is not in acute distress  Appearance: She is not ill-appearing  HENT:      Head: Normocephalic  Cardiovascular:      Rate and Rhythm: Normal rate and regular rhythm  Pulses: Normal pulses  Heart sounds: Normal heart sounds  Pulmonary:      Effort: Pulmonary effort is normal       Breath sounds: Normal breath sounds  Abdominal:      General: Abdomen is flat  Bowel sounds are normal       Palpations: Abdomen is soft  Musculoskeletal:         General: Normal range of motion        Cervical back: Normal range of motion  Skin:     General: Skin is warm and dry  Neurological:      General: No focal deficit present  Mental Status: She is alert and oriented to person, place, and time  Mental status is at baseline  Psychiatric:         Mood and Affect: Mood normal          Behavior: Behavior normal          Thought Content: Thought content normal          Judgment: Judgment normal           Discussion with Family: Patient declined call to   Discharge instructions/Information to patient and family:   See after visit summary for information provided to patient and family  Provisions for Follow-Up Care:  See after visit summary for information related to follow-up care and any pertinent home health orders  Disposition:   Home    Planned Readmission: no     Discharge Statement:  I spent 40 minutes discharging the patient  This time was spent on the day of discharge  I had direct contact with the patient on the day of discharge  Greater than 50% of the total time was spent examining patient, answering all patient questions, arranging and discussing plan of care with patient as well as directly providing post-discharge instructions  Additional time then spent on discharge activities  Discharge Medications:  See after visit summary for reconciled discharge medications provided to patient and/or family        **Please Note: This note may have been constructed using a voice recognition system**

## 2022-01-24 NOTE — ASSESSMENT & PLAN NOTE
· Likely due to COVID-19  · The patient notes that she is usually able to ambulate independently and perform all ADLs  She lives at home with her daughter    · Patient received IV fluids  · Supportive care for COVID pneumonia      PT OT was consulted patient with no needs, able to ambulate well independently    Medically stable for discharge, will discharge home

## 2022-01-24 NOTE — DISCHARGE INSTRUCTIONS
COVID-19 (Enfermedad por coronavirus 2019)   LO QUE NECESITA SABER:   La enfermedad por coronavirus 2019 (COVID-19) es la enfermedad causada por el coronavirus descubierto por primera vez en diciembre de 2019  Los coronavirus generalmente causan infecciones de las vías respiratorias superiores (nariz, garganta y pulmones), lydia un resfriado  El nuevo virus se propaga rápida y fácilmente  El virus puede transmitirse a partir de 2 días antes de que [employer]  El virus también ha [place] en varias formas nuevas (llamadas variantes) desde que se descubrió  Las variantes pueden ser más contagiosas (se propagan fácilmente) que la forma original  Además, algunas pueden causar yuri enfermedad más grave que [facility]  Es [facility] locales, nacionales y mundiales para protegerse y proteger a los demás de la infección  INSTRUCCIONES SOBRE EL ZACH HOSPITALARIA:   Si bela que usted o alguien que conoce puede estar infectado: Marisel lo siguiente para proteger a otras personas:  · Si se requiere atención de emergencia, avise al operador de la posible infección, o llame antes y avise al servicio de urgencias  · Llame a un médico para recibir instrucciones si los síntomas son leves  Cualquier persona que pueda estar infectada no  debe llegar sin llamar baldomero  El médico deberá proteger a los miembros del personal y a otros pacientes  · La persona que puede estar infectada debe usar un tapabocas mientras reciben [place] yuan  [place] ayudará a reducir el riesgo de infectar a otras personas  Nadie que sea rachid de 2 años, que tenga problemas respiratorios o que no pueda quitárselo debe usar un tapabocas  El médico puede darle instrucciones para cualquier persona que no pueda usar un tapabocas  Llame al [facility] de emergencias (911 en los [place]) o al departamento de emergencias si:  · Usted tiene dificultad para respirar o falta de aliento mientras descansa      · Usted siente presión o dolor en el pecho que dura más de 5 minutos  · Usted tiene confusión o es difícil despertarlo  · Francis labios o panchito están azules  · Usted tiene fiebre de 104 °F (40 °C) o más  Llame a gambino médico si:  · No  tiene síntomas de COVID-19 bakari tuvo contacto físico cercano dentro de los 14 días con alguien que fatimah positivo  · Usted tiene preguntas o inquietudes acerca de gambino condición o cuidado  Medicamentos: Es posible que usted necesite alguno de los siguientes:  · Los descongestivos ayudan a reducir la congestión nasal y [facility] a respirar más fácilmente  Si snow pastillas descongestivas, pueden causarle agitación o problemas para dormir  No use aerosol descongestionante por más de unos cuantos días  · Los jarabes para la tos ayudan a reducir la tos  Pregúntele a gambino médico cuál tipo de medicamento para la tos es mejor para usted  · Acetaminofén keri el dolor y baja la fiebre  Está disponible sin receta médica  Pregunte la cantidad y la frecuencia con que debe tomarlos  [address]  [place] las etiquetas de todos los demás medicamentos que esté usando para saber si también contienen acetaminofén, o pregunte a gambino médico o farmacéutico  El acetaminofén puede causar daño en el hígado cuando no se snow de forma correcta  No use más de 4 gramos (4000 miligramos) en total de acetaminofeno en un día  · Los [place], lydia el ibuprofeno, [facility] a disminuir la inflamación, el dolor y la [place]  Los KATERINA pueden causar sangrado estomacal o problemas renales en ciertas personas  Si usted snow un medicamento anticoagulante, siempre pregúntele a gambino médico si los KATERINA son seguros para usted  Siempre payton la etiqueta de toby medicamento y [place] instrucciones  · [place] francis medicamentos lydia se le haya indicado  Consulte con gambino médico si usted bela que gambino medicamento no le está ayudando o si presenta efectos secundarios  Infórmele si es alérgico a cualquier medicamento   Mantenga yuri lista actualizada de los medicamentos, las vitaminas y los productos herbales que snow  Incluya los siguientes datos de los medicamentos: cantidad, frecuencia y motivo de administración  Traiga con usted la lista o los envases de las píldoras a francis citas de seguimiento  Lleve la lista de los medicamentos con usted en krystin de ngoc emergencia  Lo que necesita saber acerca de las vacunas contra la COVID-19: Dexter Espinal tenido COVID-19  · Las vacunas contra la COVID-19 se administran en forma de inyección en 1 o 2 dosis  Varias vacunas cuentan con ngoc autorización de uso de emergencia (EUA, por francis siglas en inglés)  Ngoc EUA significa que la vacuna no está aprobada bakari se administra porque los beneficios superan los riesgos  La vacuna de 2 dosis está totalmente aprobada para gambino uso en mayores de [place]  Esta vacuna también cuenta con ngoc EUA para adolescentes de 12 a 15 años  Gambino médico lo ayudará a [employer] y [employer]  · Se recomienda ngoc tercera dosis para los adultos con un sistema inmunitario debilitado que reciben ngoc vacuna de 2 dosis  La tercera dosis se administra a los 28 días después de la segunda  · Incluso después de recibir la vacuna, continúe con el distanciamiento social y [place]  Los expertos todavía están aprendiendo lo loida que funcionan las vacunas para prevenir la infección, la transmisión y la enfermedad grave  Aunque es poco frecuente, puede contagiarse después de recibir la vacuna  También puede transmitir el virus a otras personas sin saber que tiene la infección  · Después de vacunarse, compruebe las normas de viaje locales, nacionales e internacionales  Compruebe si necesita hacerse la prueba antes de viajar  Isai vez también sea necesario hacer cuarentena tras gambino regreso  Algunos países exigen evidencia de que la prueba es negativa antes de salir  También debe hacerse la prueba entre 3 y 11 días después de regresar de otro país      Cómo se propaga el coronavirus 2019: A continuación se indican las formas en que se bela que se propaga el virus, bakari es posible que surja más información:  · Las gotitas son la principal forma de propagación de todos los coronavirus  El virus viaja en las gotitas que se milan cuando yuri persona habla, tose o estornuda  Las [employer] pueden flotar en el aire por minutos u horas  La infección se produce cuando respira las gotitas o cuando le tocan los ojos o la Renny  El contacto personal cercano con yuri persona infectada aumenta el riesgo de infección  [place] significa estar a menos de 6 pies (2 metros) de distancia de la persona cinthia al menos 15 minutos en 24 horas  · El contacto de persona a persona puede propagar el virus  Por ejemplo, yuri persona con el virus en francis neela puede propagarlo al darle la mano a alguien  · El virus puede permanecer en objetos y superficies cinthia un breve período  Puede infectarse si toca el objeto o la superficie y luego se toca los ojos o la boca  · Un animal infectado puede ser Stephane Wilson de infectar a yuri persona que lo toque  [place] puede ocurrir en [facility] vivos o en yuri rosa  Ayude a reducir el riesgo de COVID-19: La mejor manera de prevenir la infección es evitar a cualquiera que esté infectado, bakari esto puede ser difícil de lograr  Yuri persona infectada puede propagar el virus antes de que aparezcan los signos o síntomas, o incluso si los signos o síntomas nunca se desarrollan  Lo siguiente puede ayudar a reducir el riesgo de infección:      · [employer] las neela con frecuencia cinthia el día  Utilice agua y [place]  Frótese las neela enjabonadas, [facility] dedos, cinthia al menos 20 segundos  Enjuáguese con agua corriente caliente  Séquese las neela con yuri toalla limpia o yuri toalla de papel  Puede usar un desinfectante para neela que contenga alcohol, si no hay agua y jabón disponibles  Enseñe a los niños a lavarse las neela y a usar el desinfectante de [address]           · [place] al toser y estornudar  Gire la panchito y cúbrase la boca y la [place] con un pañuelo  Deseche el pañuelo  Use el ángulo del brazo si no tiene un pañuelo disponible  Luego lávese las neela con agua y [place] o use un desinfectante de neela  Enséñeles a los niños a cubrirse al toser o estornudar  · Use un tapabocas (mascarilla) cuando esté cerca de alguien que no vive en gambino casa  Utilice un tapabocas de alyse con al menos 2 capas  También puede crear capas colocando un tapabocas de alyse sobre yuri máscara no médica desechable  Cúbrase la boca y la [place]  El tapabocas debe ajustarse loida al anguiano de la nariz  Asegúrelo debajo de la barbilla y a los lados de la panchito  No use un protector facial de plástico en lugar de un tapabocas  Continúe con el distanciamiento social y [address] a menudo  El tapabocas no es un sustituto de otras medidas de seguridad de distanciamiento social          · Siga las pautas de distanciamiento social a nivel local, nacional y mundial  Mantenga al menos 6 pies (2 metros) de distancia entre usted y los demás  También mantenga esta distancia de cualquiera que venga a gambino casa, lydia alguien que ute yuri entrega  Use un tapabocas cuando esté cerca de otros  Deberá usar un tapabocas en restaurantes, tiendas y otros edificios [address]  También necesitará usar un tapabocas en el transporte público, lydia el autobús, el metro o [place]  Recuerde que debe utilizar un tapabocas de material grueso o usar 2 tapabocas juntos  · Acostúmbrese a no tocarse la panchito  Si tiene el virus [employer], puede transferirlo a los ojos, la nariz o la boca e infectarse  También puede transferirlo a los objetos, las [address]  No se toque los ojos, la nariz o la boca sin antes [employer]  · Limpie y desinfecte a menudo los objetos y las superficies de alto contacto  Use toallitas húmedas desinfectantes o ute yuri solución de 4 cucharaditas de lejía en 1 cuarto de galón (4 tazas) de agua  Limpie y desinfecte aunque piense que nadie que viva o haya entrado en gambino casa esté infectado con el virus  · Pregunte sobre otras vacunas que usted puede necesitar  Debe recibir la vacuna contra la influenza (gripe) tan pronto lydia se lo recomienden cada año, generalmente en septiembre u octubre  Vacúnese contra la neumonía si se recomienda  Gambino médico puede indicarle si también debe recibir [place], y cuándo aplicárselas  Siga las pautas de distanciamiento social: Las normas de distanciamiento social nacionales y locales varían  Las reglas pueden cambiar con el tiempo a medida que se levantan las restricciones  Las restricciones pueden volver a aplicarse si se produce un brote en el lugar donde usted vive  Es importante conocer y seguir todas las reglas de distanciamiento social actuales en gambino Rexanne Ebbs  Wandalee Sha son reglas generales al Lea Fur:  · Mitzi Marcell en casa si está enfermo o bela que puede tener COVID-19  Es importante que se quede en casa si está esperando yuri henny para yuri prueba o los [place] de [place]  Incluso si no tiene síntomas, puede transmitir el virus a otros  · Limite los viajes fuera de gambino casa  Ute que le entreguen los alimentos y [de-identified] suministros en la gladys o en [place], de ser posible  Planifique los viajes fuera de gambino casa para que ute el rachid número de paradas posibles para limitar el contacto personal cercano  [place] nota de los lugares que visita  [place] ayudará a que los rastreadores de contacto notifiquen a otros si usted se infecta  · Evite el contacto físico cercano con nadie que no viva en gambino casa  No le dé la mano, abrace o bese a yuri persona lydia saludo  Si tiene que usar el transporte público ([address]), intente sentarse o pararse lejos de los demás  Permita que ingresen a gambino casa solamente las personas necesarias  Use el tapabocas y recuérdeles a los demás que usen un tapabocas   Recuérdeles que se [address] [place] y antes de irse  No  permita el ingreso de nadie a gambino casa ni vaya usted a otra casa solo de visita  Aunque ninguno se sienta enfermos, el virus puede contagiarse de Emelyn Angles persona a [place]  · Evite las reuniones en persona y las multitudes  Las reuniones o multitudes de 10 o más individuos pueden hacer que el virus se propague  Evite los lugares lydia parques, playas, eventos deportivos y atracciones turísticas  De ser posible, asista a las [place], las comidas [place], los servicios religiosos y las conferencias en forma virtual (a través de yuri computadora)  · Pregunte a gambino médico de qué otra forma [address] citas  Algunos médicos ofrecen citas por teléfono, video u otros tipos de citas  También puede obtener recetas de francis medicamentos para varios meses de yuri vez  · Manténgase a zbigniew si debe que salir a trabajar  Mantenga la distancia física entre usted y los demás trabajadores tanto lydia sea posible  Siga las reglas de gambino empleador para que todos estén a zbigniew  Si tiene COVID-19 y se está recuperando en casa, los médicos le darán instrucciones específicas que debe seguir  Las siguientes son pautas generales para recordarle cómo mantener a los demás a zbigniew hasta que usted esté loida:  · Lávese las neela frecuentemente  Use agua y jabón tanto lydia sea posible  Puede usar un desinfectante para neela que contenga alcohol, si no hay agua y jabón disponibles  Séquese las neela con yuri toalla limpia o yuri toalla de papel  No comparta toallas con nadie  Si [place] toallas de papel, deséchelas en un cubo de basura recubierto que se guarda en gambino habitación o área  Use un cubo de basura cubierto, si es posible  · No salga de gambino casa a menos que sea necesario  [place] a yuri persona no infectada que le [employer] comestibles o insumos, o ute que se los entreguen  No acuda al consultorio del médico sin yuri henny      · Solo tenga un contacto físico cercano con yuri persona que lo cuide directamente, o con un bebé o rickey que deba cuidar  Los miembros de la lindsay y los amigos no deben visitarlo  Si es posible, quédese en un área o habitación separada de gambino casa si vive con [employer]  Nadie debe entrar en el área o en la habitación excepto para brindarle cuidados  Puede visitar a los demás por teléfono, videochat, correo electrónico o sistemas similares  · Use un tapabocas mientras haya otras personas cerca de usted  [place] puede ayudar a [employer] propaguen el virus cuando usted [address] [place], estornuda o tose  Póngase el tapabocas antes de que la persona entre en gambino habitación o [facility]  Recuérdele a la persona que se cubra la nariz y la boca antes de entrar a brindarle cuidados  · No comparta artículos  No comparta platos, toallas ni otros artículos con nadie  Los artículos deben ser lavados después de usarlos  · Evertt Sandhoff a gambino bebé  Algunos recién nacidos esteban dado positivo para el virus  Se desconoce si se infectaron antes o después del nacimiento  [address] alto es cuando el recién nacido tiene contacto cercano con yuri persona infectada  Si está embarazada o amamantando, hable con gambino médico u obstetra sobre cualquier preocupación que tenga  También le dirá cuándo debe traer a gambino bebé para los chequeos y las vacunas  También le dirá qué hacer si bela que gambino bebé está infectado con el coronavirus  Lávese las neela y colóquese un tapabocas limpio antes de amamantar o cuidar al bebé  · No manipule animales vivos a menos que sea necesario  Algunos animales, incluyendo las [place], esteban sido infectados con el nuevo coronavirus  [place] a alguien que no esté infectado que cuide de gambino mascota hasta que usted esté loida  Si debe cuidar a [employer], [place] un tapabocas  Lávese las neela antes y después de cuidar a gambino mascota  Hable con gambino médico sobre cómo [employer] a un animal de servicio, si es necesario  · Siga las instrucciones de gambino médico para estar cerca de los demás después de recuperarse   No se sabe si yuri persona recuperada puede transmitir el virus a otros, ni por cuánto tiempo  Gambino médico puede darle instrucciones, lydia continuar con el distanciamiento social y usar un tapabocas  Lio [place] cuándo podrá volver a estar con otras personas  [place] podría ser de 10 a 20 jose después del inicio de los síntomas o si tuvo yuri prueba positiva  La mayoría de los síntomas también deberán desaparecer  Gambino médico le proporcionará más información  Acuda a la consulta de control con gambino médico según las indicaciones: Anote francis preguntas para que se acuerde de hacerlas cinthia francis visitas  Para más información:  · Centers for Disease Control and Prevention  [address] [place] , 82 ENDOGENX  Phone: [phone]  Web Address: [employer] [address] 2021 Information is for End User's use only and may not be sold, redistributed or otherwise used for commercial purposes  All illustrations and images included in CareNotes® are the copyrighted property of [employer]  or [address] [place] [address] es sólo para uso en educación  Gambino intención no es darle un consejo médico sobre enfermedades o tratamientos  Colsulte con gambino Paulo Keas farmacéutico antes de seguir cualquier régimen médico para saber si es seguro y efectivo para usted  Cómo recuperarse de la COVID-19 en casa   CUIDADO AMBULATORIO:   Lo que necesita saber acerca de recuperarse de la COVID-19 en casa: La COVID-19 puede causar variedad de síntomas, de leves a severos  Si no necesita ser tratado en un hospital, se le darán instrucciones para que siga en gambino casa  Deberá estar atento al empeoramiento de los síntomas y buscar atención inmediata si es necesario  También deberá quedarse físicamente separado de otras personas para no contagiar el virus a nadie  La información sobre COVID-19 todavía se está aprendiendo  Se desconoce si yuri persona puede infectarse con el virus nuevamente después de recuperarse de COVID-19  También se desconoce si el virus puede continuar contagiándose a otras personas o por cuánto tiempo  Si bela que alguien en gambino casa puede estar infectado, ute lo siguiente para proteger a otras personas:  · Si se requiere atención de emergencia, avise al operador de la posible infección, o llame antes y avise al servicio de urgencias  · Llame a un médico para recibir instrucciones si los síntomas son leves  Cualquier persona que pueda estar infectada no  debe llegar sin llamar baldomero  El médico deberá proteger a los miembros del personal y a otros pacientes  · La persona que puede estar infectada debe usar un tapabocas mientras reciben [place]  [place] ayudará a reducir el riesgo de infectar a otras personas  Nadie que sea rachid de 2 años, que tenga problemas respiratorios o que no pueda quitárselo debe usar un tapabocas  El médico puede darle instrucciones para cualquier persona que no pueda usar un tapabocas  Llame al [facility] de emergencias (911 en los [place]) o al departamento de emergencias si:  · Usted tiene dificultad para respirar o falta de aliento mientras descansa  · Usted siente presión o dolor en el pecho que dura más de 5 minutos  · Usted tiene confusión o es difícil despertarlo  · Francis labios o panchito están azules  · Usted tiene fiebre de 104 °F (40 °C) o más  Llame a gambino médico si:  · Usted tiene síntomas nuevos, recurrentes o que empeoran  · Alguien en gambino casa no  tiene síntomas de COVID-19, bakari tuvo contacto físico cercano con usted en los últimos 14 días  · Usted tiene preguntas o inquietudes acerca de gambino condición o cuidado  Cuidados personales: Los síntomas leves podrían mejorar por sí solos  Se puede usar lo siguiente para controlar francis síntomas:  · Los descongestivos ayudan a reducir la congestión nasal y Marcine Servant a respirar más fácilmente  Si snow pastillas descongestivas, pueden causarle agitación o problemas para dormir   No use aerosol descongestionante por más de unos cuantos días  · Los jarabes para la tos ayudan a reducir la tos  Pregúntele a gambino médico cuál tipo de medicamento para la tos es mejor para usted  · Para aliviar el dolor de garganta, ute gárgaras con agua salada tibia, o use pastillas para la garganta o un aerosol para la garganta  Gambino médico puede recomendarle un medicamento para la tos  Priyanka más líquidos para disolver y aflojar la mucosidad y para prevenir la deshidratación  Use descongestivos o gotas de solución salina lydai se indica para la congestión nasal     · Los [place], lydia el ibuprofeno, ayudan a disminuir la inflamación, el dolor y la [place]  Los KATERINA pueden causar sangrado estomacal o problemas renales en ciertas personas  Si usted snow un medicamento anticoagulante, siempre pregúntele a gambino médico si los KATERINA son seguros para usted  Siempre payton la etiqueta de toby medicamento y [place] instrucciones  · Acetaminofén keri el dolor y baja la fiebre  Está disponible sin receta médica  Pregunte la cantidad y la frecuencia con que debe tomarlos  [address]  [place] las etiquetas de todos los demás medicamentos que esté usando para saber si también contienen acetaminofén, o pregunte a gambino médico o farmacéutico  El acetaminofén puede causar daño en el hígado cuando no se snow de forma correcta  No use más de 4 gramos (4000 miligramos) en total de acetaminofeno en un día  Roselyn Nations a los demás a zbigniew mientras se recupera en casa: Los [employer] darán instrucciones específicas que debe seguir  Las siguientes son pautas generales para recordarle cómo mantener a los demás a zbigniew hasta que usted esté loida:     · Lávese las neela frecuentemente  Use agua y jabón tanto lydia sea posible  Puede usar un desinfectante para neela que contenga alcohol, si no hay agua y jabón disponibles  No comparta toallas con nadie   Si [place] toallas de papel, deséchelas en un cubo de basura recubierto que se guarda en gambino habitación o área  Use un cubo de basura cubierto, si es posible  · Cúbrase al toser y estornudar  Gire la panchito y cúbrase la boca y la [place] con un pañuelo  Deseche el pañuelo  Use el ángulo del brazo si no tiene un pañuelo disponible  Luego lávese las neela con agua y [place] o use un desinfectante de neela  · Use un tapabocas (mascarilla) cuando esté cerca de alguien que no vive en gambino casa  Un tapabocas lo ayudará a evitar que contagie el virus a otras personas  No se sabe con seguridad si el virus puede contagiarse después de la recuperación o por cuánto tiempo  No use un protector facial de plástico en lugar de un tapabocas  Utilice un tapabocas de alyse con al menos 2 capas  También puede crear capas colocando un tapabocas de alyse sobre yuri máscara no médica desechable  Cúbrase la boca y la [place]  El tapabocas debe ajustarse loida al anguiano de la nariz  Asegúrelo debajo de la barbilla y a los lados de la panchito  El tapabocas no es un sustituto de otras medidas de seguridad  Continúe con el distanciamiento social y [address] a menudo  · No salga de gambino casa a menos que sea necesario  Si es posible, pídale a alguien que no esté infectado que salga a hacer a comprar los comestibles, los medicamentos y los artículos para el hogar  Pregunte a gambino médico de qué otra forma puede [place] [employer] [place]  Algunos médicos ofrecen citas por teléfono, video u otros tipos de citas  Si necesitan verlo en persona, llame por adelantado para asegurarse de que el consultorio esté preparado para usted  · No permita que nadie ingrese a gambino casa, habitación o área, zbigniew que sea necesario  Si es posible, quédese en un área o habitación separada de gambino casa si vive con [employer]  Nadie debe entrar en el área o en la habitación excepto para brindarle cuidados  Permita que solo ingresen los profesionales médicos u otros asistentes necesarios  Use un tapabocas  Recuérdeles que usen tapabocas y que se [employer]   Si es posible, pídale a alguien que esté loida que cuide de gambino bebé  Puede poner la [employer] en biberones para que la persona la use, si es necesario  Use un tapabocas si necesita amamantar o extraerse o sacarse la [place]as  Los miembros de la lindsay y los amigos no deben visitarlo  Puede visitar a los demás por teléfono, videochat, correo electrónico o sistemas similares  Es importante mantenerse conectado con los demás en gambino janine mientras se recupera  · Hable con gambino médico sobre gambino bebé  Dígale si tiene preguntas o inquietudes acerca de cómo cuidar o vincularse con gambino bebé  También le dirá cuándo debe traer a gambino bebé para los chequeos y las vacunas  También le dirá qué hacer si bela que gambino bebé está infectado con el coronavirus  · No manipule animales vivos a menos que sea necesario  Hasta que se sepa más, es mejor no tocar, jugar o manipular animales vivos  Algunos animales, incluyendo las [place], esteban sido infectados con el nuevo coronavirus  No manipule ni cuide animales hasta que esté loida  El cuidado incluye alimentar, acariciar y Bhupinder Óscar a gambino mascota  No deje que gambino mascota lo lama o comparta gambino comida  Pídale a alguien que no esté infectado que cuide de [address], si es posible  Si debe cuidar a [employer], [place] un tapabocas  Lávese las neela antes y después de cuidar a gambino mascota  Hable con gambino médico sobre cómo [employer] a un animal de servicio, si es necesario  · Siga las instrucciones de gambino médico para estar cerca de los demás después de recuperarse  No se sabe con certeza si yuri persona recuperada puede transmitir el virus a otros, ni por cuánto tiempo  Gambino médico puede darle instrucciones, lydia continuar con el distanciamiento social o usar un tapabocas cuando esté cerca de otras personas  Orelia [place] son pautas generales para cuando puede estar cerca de otros:    ? Si no diaz desarrollado ningún síntoma, espere por lo menos 10 días después de [place]   Gambino médico puede querer [employer] tenga 2 pruebas negativas consecutivas, con un intervalo de, al menos, 24 horas  [place] depende de la disponibilidad de las pruebas en gambino área  ? Si tiene síntomas, esperar al menos 10 días después de que los síntomas aparecieran por Belvin Fadi  Luego deberá pasar 24 horas sin fiebre sin recibir medicamentos para la fiebre  La mayoría de francis síntomas también deberán desaparecer  La pérdida del sentido del gusto o el olfato puede continuar cinthia varios meses  Se considera que está loida estar cerca de otros si toby es el único síntoma  ? Si fue hospitalizado por COVID-19 y necesitó oxígeno, gambino médico le indicará cinthia cuánto esperar  Es posible que deba esperar hasta 21 días después de la aparición de los síntomas  Puede ser menos si tiene 2 pruebas negativas consecutivas, con un intervalo de, al menos, 24 horas  [place] dependerá de la disponibilidad de las pruebas en gambino Lurlean Mullet  Acuda a la consulta de control con gambino médico según las indicaciones: Anote francis preguntas para que se acuerde de hacerlas cinthia francis visitas  Para más información:  · Centers for Disease Control and Prevention  [address] , [address]  Phone: [phone]- [phone]  Web Address: [facility] 2021 Information is for End User's use only and may not be sold, redistributed or otherwise used for commercial purposes  All illustrations and images included in CareNotes® are the copyrighted property of A D A M , Inc  or [address] [place] [address] es sólo para uso en educación  Gambino intención no es darle un consejo médico sobre enfermedades o tratamientos  Colsulte con gambino Burlene Henderson farmacéutico antes de seguir cualquier régimen médico para saber si es seguro y efectivo para usted

## 2022-10-12 PROBLEM — U07.1 PNEUMONIA DUE TO COVID-19 VIRUS: Status: RESOLVED | Noted: 2022-01-23 | Resolved: 2022-10-12

## 2022-10-12 PROBLEM — J12.82 PNEUMONIA DUE TO COVID-19 VIRUS: Status: RESOLVED | Noted: 2022-01-23 | Resolved: 2022-10-12

## 2023-07-07 ENCOUNTER — TELEPHONE (OUTPATIENT)
Dept: FAMILY MEDICINE CLINIC | Facility: CLINIC | Age: 78
End: 2023-07-07

## 2023-07-07 NOTE — TELEPHONE ENCOUNTER
Please schedule TCM appointment. Verify how patient is feeling and if they are in need of anything before their visit. Please send appt date and patient questions/concerns to Carlos to complete TCM.

## 2023-07-07 NOTE — TELEPHONE ENCOUNTER
Patients daughter needs to speak with the patient to see if she wants a PCP because she goes somewhere else for pain and everything else, so patients daughter told me to call back Monday to see if she wants to continue with a PCP

## 2023-07-20 ENCOUNTER — OFFICE VISIT (OUTPATIENT)
Dept: FAMILY MEDICINE CLINIC | Facility: CLINIC | Age: 78
End: 2023-07-20

## 2023-07-20 VITALS
DIASTOLIC BLOOD PRESSURE: 68 MMHG | TEMPERATURE: 97.8 F | SYSTOLIC BLOOD PRESSURE: 122 MMHG | BODY MASS INDEX: 27.88 KG/M2 | HEIGHT: 60 IN | HEART RATE: 101 BPM | WEIGHT: 142 LBS | OXYGEN SATURATION: 95 % | RESPIRATION RATE: 18 BRPM

## 2023-07-20 DIAGNOSIS — D45 POLYCYTHEMIA VERA (HCC): Primary | ICD-10-CM

## 2023-07-20 DIAGNOSIS — Z86.718 HISTORY OF DVT (DEEP VEIN THROMBOSIS): ICD-10-CM

## 2023-07-20 DIAGNOSIS — E03.9 ACQUIRED HYPOTHYROIDISM: ICD-10-CM

## 2023-07-20 DIAGNOSIS — M25.562 ACUTE PAIN OF LEFT KNEE: ICD-10-CM

## 2023-07-20 DIAGNOSIS — I10 ESSENTIAL HYPERTENSION: ICD-10-CM

## 2023-07-20 DIAGNOSIS — Z85.3 HISTORY OF BREAST CANCER: ICD-10-CM

## 2023-07-20 DIAGNOSIS — H57.11 ACUTE RIGHT EYE PAIN: ICD-10-CM

## 2023-07-20 DIAGNOSIS — M54.42 ACUTE LEFT-SIDED LOW BACK PAIN WITH LEFT-SIDED SCIATICA: ICD-10-CM

## 2023-07-20 DIAGNOSIS — M25.552 LEFT HIP PAIN: ICD-10-CM

## 2023-07-20 LAB
DME PARACHUTE DELIVERY DATE REQUESTED: NORMAL
DME PARACHUTE ITEM DESCRIPTION: NORMAL
DME PARACHUTE ORDER STATUS: NORMAL
DME PARACHUTE SUPPLIER NAME: NORMAL
DME PARACHUTE SUPPLIER PHONE: NORMAL

## 2023-07-20 PROCEDURE — 3074F SYST BP LT 130 MM HG: CPT | Performed by: PHYSICIAN ASSISTANT

## 2023-07-20 PROCEDURE — 99205 OFFICE O/P NEW HI 60 MIN: CPT | Performed by: PHYSICIAN ASSISTANT

## 2023-07-20 PROCEDURE — 3078F DIAST BP <80 MM HG: CPT | Performed by: PHYSICIAN ASSISTANT

## 2023-07-20 RX ORDER — LEVOTHYROXINE SODIUM 125 UG/1
125 CAPSULE ORAL DAILY
Qty: 90 CAPSULE | Refills: 1 | Status: SHIPPED | OUTPATIENT
Start: 2023-07-20

## 2023-07-20 RX ORDER — OXYCODONE HYDROCHLORIDE 5 MG/1
5 TABLET ORAL 2 TIMES DAILY PRN
Qty: 20 TABLET | Refills: 0 | Status: SHIPPED | OUTPATIENT
Start: 2023-07-20 | End: 2023-07-31 | Stop reason: SDUPTHER

## 2023-07-20 RX ORDER — LISINOPRIL 20 MG/1
20 TABLET ORAL DAILY
Qty: 90 TABLET | Refills: 1 | Status: SHIPPED | OUTPATIENT
Start: 2023-07-20 | End: 2023-07-20 | Stop reason: SDUPTHER

## 2023-07-20 RX ORDER — LISINOPRIL 20 MG/1
20 TABLET ORAL DAILY
Qty: 90 TABLET | Refills: 1 | Status: SHIPPED | OUTPATIENT
Start: 2023-07-20

## 2023-07-20 RX ORDER — GABAPENTIN 100 MG/1
CAPSULE ORAL
Qty: 57 CAPSULE | Refills: 1 | Status: SHIPPED | OUTPATIENT
Start: 2023-07-20 | End: 2023-07-20 | Stop reason: SDUPTHER

## 2023-07-20 RX ORDER — OXYCODONE HYDROCHLORIDE 5 MG/1
5 TABLET ORAL 2 TIMES DAILY PRN
Qty: 20 TABLET | Refills: 0 | Status: SHIPPED | OUTPATIENT
Start: 2023-07-20 | End: 2023-07-20 | Stop reason: SDUPTHER

## 2023-07-20 RX ORDER — GABAPENTIN 100 MG/1
CAPSULE ORAL
Qty: 57 CAPSULE | Refills: 1 | Status: SHIPPED | OUTPATIENT
Start: 2023-07-20 | End: 2023-08-18

## 2023-07-20 RX ORDER — LEVOTHYROXINE SODIUM 125 UG/1
125 CAPSULE ORAL DAILY
Qty: 90 CAPSULE | Refills: 1 | Status: SHIPPED | OUTPATIENT
Start: 2023-07-20 | End: 2023-07-20 | Stop reason: SDUPTHER

## 2023-07-20 NOTE — PROGRESS NOTES
Transition of Care  Follow-up After Hospitalization    Dane Hampton 69 y.o. female   Date:  7/20/2023    Hospital records were reviewed. Medications upon discharge reviewed/updated. Discharge Disposition: Recommend acute rehab but patient/family refused. Patient was discharged to home with home health services. Follow up visits with other specialists: Hematology/Oncology      HPI:  -Patient is a 60-year-old female with known past medical history of polycythemia vera, hypertension, history of breast cancer, history of DVT on Eliquis who is seen today in office for new patient visit/TCM. Patient is accompanied today by her family member.    - Patient was hospitalized at [facility] from 7/2/2023- 7/6/2023. Patient had at least 2-month history of progressive left lower extremity swelling and pain with associated exertional dyspnea. Patient reports she has been compliant with anticoagulation therapy. ED work-up notable for ultrasound showing proximal nonocclusive DVT of left lower extremity and chest CT PE study was negative for emboli. Patient was treated with heparin drip. Patient was considered not candidate for lytic therapy. For heme/unk recommendation, patient was transition to Eliquis loading dose of 10 mg twice daily for 7 days, followed by 5 mg twice daily. Patient will require indefinite anticoagulation given her second DVT and history of polycythemia vera. - PT/OT recommended short-term rehab but patient and family refused. Patient was sent home with prescription for wheelchair along with home health services for PT/OT. -Today, patient notes she is feeling a little bit better, but she continues with a lot of pain of her left lower extremity, especially at night. Patient notes it makes it difficult to sleep. Patient notes she never did receive the gabapentin which was previously prescribed by orthopedics.   Patient notes oxycodone that she was prescribed upon discharge from the hospital was helpful. Patient notes she currently does not have a shower chair. ROS: Review of Systems   Constitutional: Negative for chills and fever. HENT: Negative for congestion and sore throat. Respiratory: Negative for cough, chest tightness and shortness of breath. Cardiovascular: Positive for leg swelling. Negative for chest pain and palpitations. Gastrointestinal: Negative for abdominal pain, constipation, diarrhea, nausea and vomiting. Genitourinary: Negative for difficulty urinating and dysuria. Musculoskeletal: Positive for arthralgias, back pain and gait problem. Neurological: Negative for dizziness and headaches. Psychiatric/Behavioral: The patient is not nervous/anxious. BMI Counseling: Body mass index is 27.73 kg/m². The BMI is above normal. Nutrition recommendations include decreasing portion sizes, encouraging healthy choices of fruits and vegetables, consuming healthier snacks, limiting drinks that contain sugar, moderation in carbohydrate intake, increasing intake of lean protein and reducing intake of cholesterol. Exercise recommendations include moderate physical activity 150 minutes/week. No pharmacotherapy was ordered. Rationale for BMI follow-up plan is due to patient being overweight or obese. Depression Screening and Follow-up Plan: Patient was screened for depression during today's encounter. They screened negative with a PHQ-2 score of 0. History reviewed. No pertinent past medical history. History reviewed. No pertinent surgical history. Social History     Socioeconomic History   • Marital status:       Spouse name: None   • Number of children: None   • Years of education: None   • Highest education level: None   Occupational History   • None   Tobacco Use   • Smoking status: Never     Passive exposure: Never   • Smokeless tobacco: Never   Vaping Use   • Vaping Use: Never used   Substance and Sexual Activity   • Alcohol use: Not Currently   • Drug use: Not Currently   • Sexual activity: None   Other Topics Concern   • None   Social History Narrative   • None     Social Determinants of Health     Financial Resource Strain: Low Risk  (7/20/2023)    Overall Financial Resource Strain (CARDIA)    • Difficulty of Paying Living Expenses: Not very hard   Food Insecurity: No Food Insecurity (7/20/2023)    Hunger Vital Sign    • Worried About Running Out of Food in the Last Year: Never true    • Ran Out of Food in the Last Year: Never true   Transportation Needs: No Transportation Needs (7/20/2023)    PRAPARE - Transportation    • Lack of Transportation (Medical): No    • Lack of Transportation (Non-Medical): No   Physical Activity: Not on file   Stress: Not on file   Social Connections: Not on file   Intimate Partner Violence: Not on file   Housing Stability: Not on file       History reviewed. No pertinent family history. No Known Allergies      Current Outpatient Medications:   •  gabapentin (Neurontin) 100 mg capsule, Take 1 capsule (100 mg total) by mouth daily for 3 days, THEN 1 capsule (100 mg total) 2 (two) times a day for 27 days. , Disp: 57 capsule, Rfl: 1  •  Levothyroxine Sodium 125 MCG CAPS, Take 1 capsule (125 mcg total) by mouth in the morning, Disp: 90 capsule, Rfl: 1  •  lisinopril (ZESTRIL) 20 mg tablet, Take 1 tablet (20 mg total) by mouth daily, Disp: 90 tablet, Rfl: 1  •  oxyCODONE (Roxicodone) 5 immediate release tablet, Take 1 tablet (5 mg total) by mouth 2 (two) times a day as needed for moderate pain Max Daily Amount: 10 mg, Disp: 20 tablet, Rfl: 0  •  apixaban (Eliquis) 5 mg, Take 5 mg by mouth 2 (two) times a day, Disp: , Rfl:       Physical Exam:  /68 (BP Location: Left arm, Patient Position: Sitting, Cuff Size: Standard)   Pulse 101   Temp 97.8 °F (36.6 °C) (Temporal)   Resp 18   Ht 5' (1.524 m)   Wt 64.4 kg (142 lb)   SpO2 95%   BMI 27.73 kg/m²     Physical Exam  Vitals and nursing note reviewed. Constitutional:       General: She is not in acute distress. Appearance: She is well-developed. Comments: Ambulating with walker   HENT:      Head: Normocephalic and atraumatic. Right Ear: External ear normal.      Left Ear: External ear normal.      Nose: Nose normal.   Eyes:      Conjunctiva/sclera: Conjunctivae normal.   Cardiovascular:      Rate and Rhythm: Normal rate and regular rhythm. Pulses: Normal pulses. Heart sounds: Normal heart sounds. Pulmonary:      Effort: Pulmonary effort is normal. No respiratory distress. Breath sounds: Normal breath sounds. No wheezing. Musculoskeletal:         General: Normal range of motion. Cervical back: Normal range of motion and neck supple. Right lower leg: No edema. Left lower leg: Edema (+2; nontender, nonerythematous (per pt, swelling improved from prior)) present. Skin:     General: Skin is warm and dry. Neurological:      Mental Status: She is alert and oriented to person, place, and time. Psychiatric:         Behavior: Behavior normal.             Labs:  Lab Results   Component Value Date    WBC 9.50 01/24/2022    HGB 13.0 01/24/2022    HCT 46.9 (H) 01/24/2022    MCV 56 (L) 01/24/2022     01/24/2022     Lab Results   Component Value Date    K 4.5 01/24/2022     (H) 01/24/2022    CO2 24 01/24/2022    BUN 6 01/24/2022    CREATININE 0.72 01/24/2022    GLUF 62 (L) 01/24/2022    CALCIUM 8.1 (L) 01/24/2022    CORRECTEDCA 7.7 (L) 01/23/2022    AST 50 (H) 01/23/2022    ALT 12 01/23/2022    ALKPHOS 60 01/23/2022    EGFR 82 01/24/2022       Assessment and Plan:    Acquired hypothyroidism  - Continue levothyroxine 125 mcg daily. - We will check updated TSH. Essential hypertension  - Continue lisinopril 20 mg once daily. - Currently blood pressure is controlled at this time. Reviewed BP target goal with patient. - Continue to maintain healthy balanced diet with focus on low salt intake.  Limit alcohol intake. - Advised to exercise at least 30 minutes a day for at least 5 days out of the week. Polycythemia vera (HCC)  - Continue hydroxyurea 500 mg twice daily as prescribed by hematology/oncology. - Continue follow-up with hematology/oncology as scheduled. Next scheduled follow-up is 8/7/23. Acute left-sided low back pain with left-sided sciatica  -Continue follow-up with [facility] orthopedics as scheduled. - Per chart review, patient was prescribed gabapentin, however patient reports she never received it. We will place updated prescription for gabapentin 100 mg, daily x3 days, then increase to twice daily thereafter.  - Patient discharged from hospital with short-term supply of oxycodone 5 mg. We will provide short-term refill of oxycodone 5 mg, twice daily as needed for pain. - Assessed possible risk for misuse, abuse, or addiction based on patient's family and social history.  - Educated patient on possible side effects and risks with taking this medication including risks of abuse, misuse, and addiction.  - PDMP reviewed. No red flags noted. Acute pain of left knee  -Per chart review, patient obtained corticosteroid injection of left knee on 4/24/2023 with some improvement.  -Continue follow-up with [facility] orthopedics as scheduled. - Continue follow-up with home physical therapy/Occupational Therapy. Left hip pain  -Continue follow-up with [facility] orthopedics as scheduled. - Continue follow-up with home physical therapy/Occupational Therapy. History of breast cancer  - Status post right partial mastectomy in 2012, completed in [place].  No adjuvant therapy. - Continue follow-up with hematology/oncology as scheduled. History of DVT (deep vein thrombosis)  -Recently hospitalized in early July 2023. Ultrasound showed "nonocclusive DVT of proximal vein of left lower extremity. "  - JAK2 positive MPN, high-thrombotic risk.   - Continue Eliquis 5 mg twice daily.  - Per hematology/oncology's recommendation, patient will need indefinite anticoagulation given this is her second DVT and history of polycythemia vera. - Continue follow-up with hematology/oncology as scheduled. Yvrose Gudino was seen today for new patient visit. Diagnoses and all orders for this visit:    Polycythemia vera ([address])  -     CBC and differential; Future    Acquired hypothyroidism  -     Levothyroxine Sodium 125 MCG CAPS; Take 1 capsule (125 mcg total) by mouth in the morning  -     TSH, 3rd generation with Free T4 reflex; Future    Essential hypertension  -     lisinopril (ZESTRIL) 20 mg tablet; Take 1 tablet (20 mg total) by mouth daily  -     Comprehensive metabolic panel; Future  -     Lipid panel; Future    Acute right eye pain  -     Ambulatory Referral to Ophthalmology; Future    Acute left-sided low back pain with left-sided sciatica  -     gabapentin (Neurontin) 100 mg capsule; Take 1 capsule (100 mg total) by mouth daily for 3 days, THEN 1 capsule (100 mg total) 2 (two) times a day for 27 days. -     oxyCODONE (Roxicodone) 5 immediate release tablet; Take 1 tablet (5 mg total) by mouth 2 (two) times a day as needed for moderate pain Max Daily Amount: 10 mg    History of DVT (deep vein thrombosis)    Acute pain of left knee    Left hip pain    History of breast cancer    Other orders  -     Shower Chair with Arms [$]      - Utilized Language Learning Class services for translation.

## 2023-07-20 NOTE — ASSESSMENT & PLAN NOTE
-Per chart review, patient obtained corticosteroid injection of left knee on 4/24/2023 with some improvement.  -Continue follow-up with [facility] orthopedics as scheduled. - Continue follow-up with home physical therapy/Occupational Therapy.

## 2023-07-20 NOTE — ASSESSMENT & PLAN NOTE
-Continue follow-up with [facility] orthopedics as scheduled. - Continue follow-up with home physical therapy/Occupational Therapy.

## 2023-07-20 NOTE — ASSESSMENT & PLAN NOTE
-Recently hospitalized in early July 2023. Ultrasound showed "nonocclusive DVT of proximal vein of left lower extremity. "  - JAK2 positive MPN, high-thrombotic risk. - Continue Eliquis 5 mg twice daily.  - Per hematology/oncology's recommendation, patient will need indefinite anticoagulation given this is her second DVT and history of polycythemia vera. - Continue follow-up with hematology/oncology as scheduled.

## 2023-07-20 NOTE — PATIENT INSTRUCTIONS
Dr. Monica Kincaid [phone], [facility], [address] [place] [address]  [phone]    [facility] [address] [facility], [address] [place] [address]  [phone]    [facility]  [address] [address]  [phone]
General

## 2023-07-20 NOTE — ASSESSMENT & PLAN NOTE
- Status post right partial mastectomy in 2012, completed in [place].  No adjuvant therapy. - Continue follow-up with hematology/oncology as scheduled.

## 2023-07-20 NOTE — ASSESSMENT & PLAN NOTE
- Continue lisinopril 20 mg once daily. - Currently blood pressure is controlled at this time. Reviewed BP target goal with patient. - Continue to maintain healthy balanced diet with focus on low salt intake. Limit alcohol intake. - Advised to exercise at least 30 minutes a day for at least 5 days out of the week.

## 2023-07-20 NOTE — ASSESSMENT & PLAN NOTE
- Continue hydroxyurea 500 mg twice daily as prescribed by hematology/oncology. - Continue follow-up with hematology/oncology as scheduled. Next scheduled follow-up is 8/7/23.

## 2023-07-20 NOTE — ASSESSMENT & PLAN NOTE
-Continue follow-up with [facility] orthopedics as scheduled. - Per chart review, patient was prescribed gabapentin, however patient reports she never received it. We will place updated prescription for gabapentin 100 mg, daily x3 days, then increase to twice daily thereafter.  - Patient discharged from hospital with short-term supply of oxycodone 5 mg. We will provide short-term refill of oxycodone 5 mg, twice daily as needed for pain. - Assessed possible risk for misuse, abuse, or addiction based on patient's family and social history.  - Educated patient on possible side effects and risks with taking this medication including risks of abuse, misuse, and addiction.  - PDMP reviewed. No red flags noted.

## 2023-07-21 ENCOUNTER — TELEPHONE (OUTPATIENT)
Dept: ADMINISTRATIVE | Facility: OTHER | Age: 78
End: 2023-07-21

## 2023-07-21 NOTE — TELEPHONE ENCOUNTER
----- Message from Negra Curtis PA-C sent at 7/20/2023  4:53 PM EDT -----  Regarding: Hep C Screening  07/20/23 4:53 PM    Hello, our patient Tre Eden has had Hepatitis C completed/performed. Please assist in updating the patient chart by pulling the Care Everywhere (CE) document. The date of service is 2/29/2020.      Thank you,  FRANKLIN Anderson

## 2023-07-21 NOTE — TELEPHONE ENCOUNTER
Upon review of the In Basket request we were able to locate, review, and update the patient chart as requested for Hepatitis C . Any additional questions or concerns should be emailed to the Practice Liaisons via the appropriate education email address, please do not reply via In Basket.     Thank you  Brittny Andujar

## 2023-07-26 ENCOUNTER — TELEPHONE (OUTPATIENT)
Dept: FAMILY MEDICINE CLINIC | Facility: CLINIC | Age: 78
End: 2023-07-26

## 2023-07-26 NOTE — TELEPHONE ENCOUNTER
Called to inform PCP pt has been discharge from home care nursing but will still have PT for the next couple weeks.

## 2023-07-31 ENCOUNTER — TELEPHONE (OUTPATIENT)
Dept: FAMILY MEDICINE CLINIC | Facility: CLINIC | Age: 78
End: 2023-07-31

## 2023-07-31 DIAGNOSIS — M54.42 ACUTE LEFT-SIDED LOW BACK PAIN WITH LEFT-SIDED SCIATICA: ICD-10-CM

## 2023-07-31 RX ORDER — OXYCODONE HYDROCHLORIDE 5 MG/1
5 TABLET ORAL 2 TIMES DAILY PRN
Qty: 20 TABLET | Refills: 0 | Status: SHIPPED | OUTPATIENT
Start: 2023-07-31

## 2023-07-31 NOTE — TELEPHONE ENCOUNTER
Hi, my name is Vin Boyce. I work for physical therapy for [facility]. My patient that I'm with today is Alley Harvey Her medical record number is [id number]. Date of birth [birth year]. I believe her primary care doctor is Doctor Shad Frazier. The reason for my call is she was recently in the hospital due to a blood clot. She was sent home with medication for pain, Oxycodone, HCL 5 milligram tablet. She is currently out of the oxy and was hoping to get a refill. However, this was prescribed by the hospital doctor so I cannot go to him as he is no longer under her care. So I'm reaching out to you as her primary care doctor. Can you please give me a call back as to whether you guys would be able to refill this or not? My number is [phone]. You can also reach out to the patient. Her phone number is [phone]. The swelling in her leg has gone down a good bit, but she's still kind of struggling with a similar pain level that she's been having. Thank you for your consideration. Have a good day.

## 2023-07-31 NOTE — TELEPHONE ENCOUNTER
Reviewed PDMP. Will provide short term refill until patient's next appointment. Prescription sent. Thanks!

## 2023-09-03 NOTE — CASE MANAGEMENT
Case Management Discharge Planning Note    Patient name Lily Forward  Location [facility] 714/[facility] [id number] MRN [id number]  : 1945 Date 2022       Current Admission Date: 2022  Current Admission Diagnosis:Generalized weakness   Patient Active Problem List    Diagnosis Date Noted    Generalized weakness 2022    Pneumonia due to COVID-19 virus 2022    Hypokalemia 2022    Polycythemia vera ([facility] [address] ) 2022    History of DVT (deep vein thrombosis) 2022    Breast cancer ([facility] [address] ) 2022    Acquired hypothyroidism 2022    Essential hypertension 2022      LOS (days): 0  Geometric Mean LOS (GMLOS) (days):   Days to GMLOS:     OBJECTIVE:     Current admission status: Observation   Preferred Pharmacy:   [address], [address] [place] [address]  Phone: [phone] Fax: [phone]    Primary Care Provider: No primary care provider on file  DISCHARGE DETAILS: CM was notified at morning rounds that pt is medically cleared to be discharged today  CM spoke with pt's Grandchild : Amy Gee ([id number]) [phone] (M) to discuss discharge planning  Pt will return back to her previous environment  CM updated Follow up provider with resources for PCP  CM referred pt to [address] [place] [address] [facility] as pt does not have a PCP  Transportation: Family  at [address] [place] [address] department will continue to follow through pt's D/C No Debility

## 2023-11-16 ENCOUNTER — TELEPHONE (OUTPATIENT)
Dept: FAMILY MEDICINE CLINIC | Facility: CLINIC | Age: 78
End: 2023-11-16

## 2024-02-07 ENCOUNTER — TELEPHONE (OUTPATIENT)
Age: 79
End: 2024-02-07

## 2024-02-07 NOTE — TELEPHONE ENCOUNTER
Unable to make appt for patient as there is no Communication consent for on file. Daughter in law states she is in pain in both legs.

## 2024-06-12 ENCOUNTER — TELEPHONE (OUTPATIENT)
Dept: FAMILY MEDICINE CLINIC | Facility: CLINIC | Age: 79
End: 2024-06-12

## 2024-06-12 NOTE — TELEPHONE ENCOUNTER
first attempt to contact patient. no answer left message to return my call on answering machine      This pt last time been seen was on 07/20/23 and we receive a [employer] form      Pt needs to make allyssa

## 2024-06-12 NOTE — TELEPHONE ENCOUNTER
PCP SIGNATURE NEEDED FOR [facility] FORM RECEIVED VIA FAX AND PLACED IN PCP FOLDER TO BE DELIVERED AT ASSIGNED TIMES.

## 2024-09-26 ENCOUNTER — TELEPHONE (OUTPATIENT)
Dept: FAMILY MEDICINE CLINIC | Facility: CLINIC | Age: 79
End: 2024-09-26

## 2024-09-26 NOTE — TELEPHONE ENCOUNTER
09/26/24 6:30 PM        The office's request has been received, reviewed, and the patient chart updated. The PCP has successfully been removed with a patient attribution note. This message will now be completed.        Thank you  Devaughn Roca

## 2024-09-26 NOTE — TELEPHONE ENCOUNTER
NO LONGER A [facility]- [facility] PATIENT. PLEASE REMOVE FROM PATIENT PANEL.   NEW PCP: HAN MARRERO